# Patient Record
Sex: MALE | Race: WHITE | Employment: OTHER | URBAN - METROPOLITAN AREA
[De-identification: names, ages, dates, MRNs, and addresses within clinical notes are randomized per-mention and may not be internally consistent; named-entity substitution may affect disease eponyms.]

---

## 2022-06-16 ENCOUNTER — OFFICE VISIT (OUTPATIENT)
Dept: PODIATRY | Facility: CLINIC | Age: 72
End: 2022-06-16
Payer: COMMERCIAL

## 2022-06-16 VITALS — SYSTOLIC BLOOD PRESSURE: 144 MMHG | WEIGHT: 177 LBS | DIASTOLIC BLOOD PRESSURE: 60 MMHG

## 2022-06-16 DIAGNOSIS — G62.9 PERIPHERAL POLYNEUROPATHY: ICD-10-CM

## 2022-06-16 DIAGNOSIS — M72.2 PLANTAR FASCIITIS: Primary | ICD-10-CM

## 2022-06-16 PROBLEM — U07.1 COVID-19: Status: ACTIVE | Noted: 2022-06-16

## 2022-06-16 PROBLEM — R79.89 ABNORMAL LIVER FUNCTION TESTS: Status: ACTIVE | Noted: 2022-06-16

## 2022-06-16 PROBLEM — Z87.891 PERSONAL HISTORY OF TOBACCO USE, PRESENTING HAZARDS TO HEALTH: Status: ACTIVE | Noted: 2022-06-16

## 2022-06-16 PROBLEM — G47.00 INSOMNIA: Status: ACTIVE | Noted: 2022-06-16

## 2022-06-16 PROBLEM — M51.26 HERNIATION OF LUMBAR INTERVERTEBRAL DISC WITHOUT MYELOPATHY: Status: ACTIVE | Noted: 2022-06-16

## 2022-06-16 PROBLEM — J44.9 CHRONIC OBSTRUCTIVE PULMONARY DISEASE (H): Status: ACTIVE | Noted: 2022-06-16

## 2022-06-16 PROBLEM — F32.A DEPRESSION: Status: ACTIVE | Noted: 2022-06-16

## 2022-06-16 PROBLEM — L72.0 EPIDERMOID CYST OF SKIN: Status: ACTIVE | Noted: 2022-06-16

## 2022-06-16 PROBLEM — R69 OTHER ILL-DEFINED AND UNKNOWN CAUSES OF MORBIDITY AND MORTALITY: Status: ACTIVE | Noted: 2022-06-16

## 2022-06-16 PROBLEM — I25.10 ATHEROSCLEROSIS OF CORONARY ARTERY: Status: ACTIVE | Noted: 2022-06-16

## 2022-06-16 PROBLEM — R73.01 IMPAIRED FASTING GLUCOSE: Status: ACTIVE | Noted: 2022-06-16

## 2022-06-16 PROBLEM — F10.20 ALCOHOL DEPENDENCE, CONTINUOUS (H): Status: ACTIVE | Noted: 2022-06-16

## 2022-06-16 PROBLEM — M54.50 LOW BACK PAIN: Status: ACTIVE | Noted: 2022-06-16

## 2022-06-16 PROBLEM — D17.39 LIPOMA OF OTHER SKIN AND SUBCUTANEOUS TISSUE: Status: ACTIVE | Noted: 2022-06-16

## 2022-06-16 PROBLEM — I10 HYPERTENSION: Status: ACTIVE | Noted: 2022-06-16

## 2022-06-16 PROBLEM — L71.9 ROSACEA: Status: ACTIVE | Noted: 2022-06-16

## 2022-06-16 PROBLEM — G51.0 BELL'S PALSY: Status: ACTIVE | Noted: 2022-06-16

## 2022-06-16 PROBLEM — E78.5 OTHER AND UNSPECIFIED HYPERLIPIDEMIA: Status: ACTIVE | Noted: 2022-06-16

## 2022-06-16 PROBLEM — J30.9 ALLERGIC RHINITIS: Status: ACTIVE | Noted: 2022-06-16

## 2022-06-16 PROBLEM — N52.9 IMPOTENCE OF ORGANIC ORIGIN: Status: ACTIVE | Noted: 2022-06-16

## 2022-06-16 PROCEDURE — 99203 OFFICE O/P NEW LOW 30 MIN: CPT | Performed by: PODIATRIST

## 2022-06-16 RX ORDER — ROSUVASTATIN CALCIUM 40 MG/1
20 TABLET, COATED ORAL
COMMUNITY
Start: 2022-02-21

## 2022-06-16 RX ORDER — IPRATROPIUM BROMIDE 21 UG/1
SPRAY, METERED NASAL
COMMUNITY
Start: 2022-03-16

## 2022-06-16 RX ORDER — PREDNISONE 20 MG/1
40 TABLET ORAL
COMMUNITY
Start: 2021-08-31

## 2022-06-16 RX ORDER — DOXYCYCLINE HYCLATE 100 MG
100 TABLET ORAL
COMMUNITY
Start: 2021-12-31

## 2022-06-16 RX ORDER — METOPROLOL TARTRATE 50 MG
25 TABLET ORAL 2 TIMES DAILY
COMMUNITY
Start: 2022-04-13

## 2022-06-16 NOTE — PROGRESS NOTES
Subjective:    Patient is seen today as a new pt self referral with a 50 year hx of bilateral heel pain.  Points to the plantarmedial calcaneal tubercle.  Most painful upon rising in a.m. or after prolonged sitting.  Aggravated by activity and relieved by rest.    Denies erythema, edema, ecchymosis,  loss of strength.  Patient is retired.  He is wearing slippers around the house.  Also complains of numbness and tingling in all 10 toes.  This is constant and always there.  Has had this for several years.  States it does not keep him from doing anything.  Denies weakness.  [facility]  History of alcohol abuse.    ROS:  See above       Allergies   Allergen Reactions     Atorvastatin Muscle Pain (Myalgia)       Current Outpatient Medications   Medication Sig Dispense Refill     doxycycline hyclate (VIBRA-TABS) 100 MG tablet Take 100 mg by mouth       ipratropium (ATROVENT) 0.03 % nasal spray        metoprolol tartrate (LOPRESSOR) 50 MG tablet Take 25 mg by mouth       mometasone furoate (ASMANEX HFA) 100 MCG/ACT inhaler        predniSONE (DELTASONE) 20 MG tablet Take 40 mg by mouth       rosuvastatin (CRESTOR) 40 MG tablet Take 20 mg by mouth       tiotropium-olodaterol 2.5-2.5 MCG/ACT AERS          Patient Active Problem List   Diagnosis     Rosacea     Hypertension     Personal history of tobacco use, presenting hazards to health     Other ill-defined and unknown causes of morbidity and mortality     Low back pain     Lipoma of other skin and subcutaneous tissue     Insomnia     Impotence of organic origin     Impaired fasting glucose     Other and unspecified hyperlipidemia     Herpes zoster     Herniation of lumbar intervertebral disc without myelopathy     Epidermoid cyst of skin     Depression     COVID-19     Chronic obstructive pulmonary disease (H)     Bell's palsy     Atherosclerosis of coronary artery     Allergic rhinitis     Alcohol dependence, continuous (H)     Abnormal liver  function tests       History reviewed. No pertinent past medical history.    No past surgical history on file.    No family history on file.    Social History     Tobacco Use     Smoking status: Smoker, Current Status Unknown     Smokeless tobacco: Never Used   Substance Use Topics     Alcohol use: Not on file         Objective:    Vitals: BP (!) 144/60   Wt 80.3 kg (177 lb)   BMI: There is no height or weight on file to calculate BMI.  Height: Data Unavailable    Constitutional/ general:  Pt is in no apparent distress, appears well-nourished.  Cooperative with history and physical exam.     Psych:  The patient answered questions appropriately.  Normal affect.  Seems to have reasonable expectations, in terms of treatment.     Lungs:  Non labored breathing, non labored speech. No cough.  No audible wheezing. Even, quiet breathing.       Vascular:  Pedal pulses are palpable bilaterally for both the DP and PT arteries.  CFT < 3 sec. slight ankle edema pedal hair growth noted.     Neuro:  Alert and oriented x 3. Coordinated gait.  Light touch sensation is diminished on the toes.  Muscle strength 4/5 in all compartments.  Negative Tinel's sign.  Monofilament intact in all digits.    Derm: Normal texture and turgor.  No erythema, ecchymosis, or cyanosis.  No open lesions.     Musculoskeletal:    Lower extremity muscle strength is normal.  Patient is ambulatory without an assistive device or brace.  No gross deformities.      Normal arch with weightbearing.   ROM is within normal limits.   No side to side compression pain of the calcaneus.  Pain upon palpation to the bilateral plantarmedial  of the calcaneus.  No erythema, edema, ecchymosis, or subcutaneous masses noted.  No pain on palpation or stressing any tendons.  Negative Tinel's sign        Assessment:  Plantar Fasciitis right and left foot                          Peripheral neuropathy    Plan:   Discussed etiology and treatment options with the patient.  The  potential causes and nature of plantar fasciitis were discussed with the patient.  We reviewed the natural history/prognosis of the condition and risks if left untreated.  These include chronic pain, other sites of pain due to gait changes, and potential plantar fascial rupture.      We discussed possible causes of the condition as it relates to the patients specific situation.      Conservative treatment options were reviewed:  appropriate shoes, avoidance of barefoot walking, inserts/orthoses, stretching, ice, massage, immobilization and NSAIDs.     We also reviewed the options of injection therapy and surgery.  However, it was made clear that surgery is only considered when conservative therapy fails.  The risks and benefits of injection therapy, and surgery were discussed.  Dispensed handout.       After thorough discussion and answering all questions, the patient elected to modifying activities, supportive shoes, ice, stretching, and not going barefoot.  Good house shoes at all times and I made recommendations.   Dispensed heel cups.    Discussed numbness in toes is from peripheral neuropathy.  Discussed more common causes.  Discussed this could be idiopathic.  Encouraged decrease OH use.  [facility].  RTC as needed.      Roque Andino, NOREEN, FACFAS

## 2022-06-16 NOTE — LETTER
6/16/2022         RE: Saúl Portillo  [address]  [place] [address]        Dear Colleague,    Thank you for referring your patient, Saúl Portillo, to the [facility]. Please see a copy of my visit note below.    Subjective:    Patient is seen today as a new pt self referral with a 50 year hx of bilateral heel pain.  Points to the plantarmedial calcaneal tubercle.  Most painful upon rising in a.m. or after prolonged sitting.  Aggravated by activity and relieved by rest.    Denies erythema, edema, ecchymosis,  loss of strength.  Patient is retired.  He is wearing slippers around the house.  Also complains of numbness and tingling in all 10 toes.  This is constant and always there.  Has had this for several years.  States it does not keep him from doing anything.  Denies weakness.  [facility]  History of alcohol abuse.    ROS:  See above       Allergies   Allergen Reactions     Atorvastatin Muscle Pain (Myalgia)       Current Outpatient Medications   Medication Sig Dispense Refill     doxycycline hyclate (VIBRA-TABS) 100 MG tablet Take 100 mg by mouth       ipratropium (ATROVENT) 0.03 % nasal spray        metoprolol tartrate (LOPRESSOR) 50 MG tablet Take 25 mg by mouth       mometasone furoate (ASMANEX HFA) 100 MCG/ACT inhaler        predniSONE (DELTASONE) 20 MG tablet Take 40 mg by mouth       rosuvastatin (CRESTOR) 40 MG tablet Take 20 mg by mouth       tiotropium-olodaterol 2.5-2.5 MCG/ACT AERS          Patient Active Problem List   Diagnosis     Rosacea     Hypertension     Personal history of tobacco use, presenting hazards to health     Other ill-defined and unknown causes of morbidity and mortality     Low back pain     Lipoma of other skin and subcutaneous tissue     Insomnia     Impotence of organic origin     Impaired fasting glucose     Other and unspecified hyperlipidemia     Herpes zoster     Herniation of lumbar intervertebral disc without  myelopathy     Epidermoid cyst of skin     Depression     COVID-19     Chronic obstructive pulmonary disease (H)     Bell's palsy     Atherosclerosis of coronary artery     Allergic rhinitis     Alcohol dependence, continuous (H)     Abnormal liver function tests       History reviewed. No pertinent past medical history.    No past surgical history on file.    No family history on file.    Social History     Tobacco Use     Smoking status: Smoker, Current Status Unknown     Smokeless tobacco: Never Used   Substance Use Topics     Alcohol use: Not on file         Objective:    Vitals: BP (!) 144/60   Wt 80.3 kg (177 lb)   BMI: There is no height or weight on file to calculate BMI.  Height: Data Unavailable    Constitutional/ general:  Pt is in no apparent distress, appears well-nourished.  Cooperative with history and physical exam.     Psych:  The patient answered questions appropriately.  Normal affect.  Seems to have reasonable expectations, in terms of treatment.     Lungs:  Non labored breathing, non labored speech. No cough.  No audible wheezing. Even, quiet breathing.       Vascular:  Pedal pulses are palpable bilaterally for both the DP and PT arteries.  CFT < 3 sec. slight ankle edema pedal hair growth noted.     Neuro:  Alert and oriented x 3. Coordinated gait.  Light touch sensation is diminished on the toes.  Muscle strength 4/5 in all compartments.  Negative Tinel's sign.  Monofilament intact in all digits.    Derm: Normal texture and turgor.  No erythema, ecchymosis, or cyanosis.  No open lesions.     Musculoskeletal:    Lower extremity muscle strength is normal.  Patient is ambulatory without an assistive device or brace.  No gross deformities.      Normal arch with weightbearing.   ROM is within normal limits.   No side to side compression pain of the calcaneus.  Pain upon palpation to the bilateral plantarmedial  of the calcaneus.  No erythema, edema, ecchymosis, or subcutaneous masses noted.  No  pain on palpation or stressing any tendons.  Negative Tinel's sign        Assessment:  Plantar Fasciitis right and left foot                          Peripheral neuropathy    Plan:   Discussed etiology and treatment options with the patient.  The potential causes and nature of plantar fasciitis were discussed with the patient.  We reviewed the natural history/prognosis of the condition and risks if left untreated.  These include chronic pain, other sites of pain due to gait changes, and potential plantar fascial rupture.      We discussed possible causes of the condition as it relates to the patients specific situation.      Conservative treatment options were reviewed:  appropriate shoes, avoidance of barefoot walking, inserts/orthoses, stretching, ice, massage, immobilization and NSAIDs.     We also reviewed the options of injection therapy and surgery.  However, it was made clear that surgery is only considered when conservative therapy fails.  The risks and benefits of injection therapy, and surgery were discussed.  Dispensed handout.       After thorough discussion and answering all questions, the patient elected to modifying activities, supportive shoes, ice, stretching, and not going barefoot.  Good house shoes at all times and I made recommendations.   Dispensed heel cups.    Discussed numbness in toes is from peripheral neuropathy.  Discussed more common causes.  Discussed this could be idiopathic.  Encouraged decrease OH use.  [facility].  RTC as needed.      Roque Andino DPM, FACFAS          Again, thank you for allowing me to participate in the care of your patient.        Sincerely,        Roque Andino DPM

## 2022-06-16 NOTE — PATIENT INSTRUCTIONS
We wish you continued good healing. If you have any questions or concerns, please do not hesitate to contact us at  [phone]    [employer] (secure e-mail communication and access to your chart) to send a message or to make an appointment.    Please remember to call and schedule a follow up appointment if one was recommended at your earliest convenience.     PODIATRY CLINIC HOURS  TELEPHONE NUMBER    Dr. Roque JERRYPRALEIGH [facility]        Clinics:  [place] Vargas CMA   Tuesday 1PM-6PM  [place]  Wednesday 745AM-330PM  [place]  Thursday/Friday 745AM-230PM  [facility] CRENSHAW/[facility] APPOINTMENTS  [phone]    [place] APPOINTMENTS  [phone]        If you need a medication refill, please contact us you may need lab work and/or a follow up visit prior to your refill (i.e. Antifungal medications).  If MRI needed please call Imaging at [phone] or [phone]  HOW DO I GET MY KNEE SCOOTER? Knee scooters can be picked up at ANY Medical Supply stores with your knee scooter Prescription.  OR  Bring your signed prescription to an [facility] Medical Equipment showroom.

## 2022-07-07 NOTE — PROGRESS NOTES
[facility]      Referring provider: Self-referred    HPI: Mr. Saúl Portillo is a 71 year old  male with PMH significant for    -Hypertension  -Former smoker (patient quit 25 years ago, 25 pack years)  -Impaired fasting glucose  -COPD  -Alcohol dependence  -COVID-19  -Hyperlipidemia.     Patient is a  and [facility]  Today he is telling me that he wants to undergo an angiogram because angiogram 15 years ago showed a collapsed artery.  Few years ago per patient report [facility] and he was told he does not need an angiogram.  He tells me that he is very anxious about his coronary artery disease and he wants to know if he has further worsening of coronary artery disease by coronary angiogram.    He tells me that he is not physically very active.  Feels short of breath with activity which has gotten worse over the last 2 years.  Cannot go up and down the stores and struggles with breathing.  Denies chest pain, dizziness, syncope or lower extremity edema.    Reports normal blood pressure at home.  He tells me his blood pressure is usually high when he comes to clinic.  He quit smoking 25 years ago.  He drinks 2 or 3 alcoholic beverages 4-5 times a week.  He was on baby aspirin until few years ago but he quit because of multiple bruises.  He is currently on rosuvastatin 40 mg, metoprolol 25 mg twice daily and inhalers.  Prior cardiac tests are not available for me to see today.  Patient tells me that he is [facility]    Medications, personal, family, and social history reviewed with patient and revised.    PAST MEDICAL HISTORY:  No past medical history on file.    CURRENT MEDICATIONS:  Current Outpatient Medications   Medication Sig Dispense Refill     doxycycline hyclate (VIBRA-TABS) 100 MG  "tablet Take 100 mg by mouth       ipratropium (ATROVENT) 0.03 % nasal spray        metoprolol tartrate (LOPRESSOR) 50 MG tablet Take 25 mg by mouth       mometasone furoate (ASMANEX HFA) 100 MCG/ACT inhaler        predniSONE (DELTASONE) 20 MG tablet Take 40 mg by mouth       rosuvastatin (CRESTOR) 40 MG tablet Take 20 mg by mouth       tiotropium-olodaterol 2.5-2.5 MCG/ACT AERS          PAST SURGICAL HISTORY:  No past surgical history on file.    ALLERGIES:     Allergies   Allergen Reactions     Atorvastatin Muscle Pain (Myalgia)       FAMILY HISTORY:  No family history on file.      SOCIAL HISTORY:  Social History     Tobacco Use     Smoking status: Smoker, Current Status Unknown     Smokeless tobacco: Never Used       ROS:   Constitutional: No fever, chills, or sweats. Weight stable.   Cardiovascular: As per HPI.       Exam:  /77 (BP Location: Left arm, Cuff Size: Adult Regular)   Pulse 51   Ht 1.654 m (5' 5.1\")   Wt 80.5 kg (177 lb 6.4 oz)   SpO2 96%   BMI 29.43 kg/m    GENERAL APPEARANCE: alert and no distress  HEENT: no icterus, no central cyanosis  LYMPH/NECK: no adenopathy, no asymmetry, JVP not elevated, no carotid bruits.  RESPIRATORY: lungs clear to auscultation - no rales, rhonchi or wheezes, no use of accessory muscles, no retractions, respirations are unlabored, normal respiratory rate  CARDIOVASCULAR: regular rhythm, normal S1, S2, no S3 or S4 and no murmur, click or rub, precordium quiet with normal PMI.  GI: soft, non tender  EXTREMITIES: no edema  NEURO: alert, normal speech,and affect  SKIN: no ecchymoses, no rashes     I have reviewed the labs and personally reviewed the imaging below and made my comment in the assessment and plan.    Labs:  CBC RESULTS:   No results found for: WBC, RBC, HGB, HCT, MCV, MCH, MCHC, RDW, PLT    BMP RESULTS:  No results found for: NA, POTASSIUM, CHLORIDE, CO2, ANIONGAP, GLC, BUN, CR, GFRESTIMATED, GFRESTBLACK, VIPUL     Echocardiogram  No prior    EKG " personally reviewed in clinic today which shows sinus bradycardia and old inferior MI with pathologic Q waves in lead III and aVF.      Assessment and Plan:     #History of coronary artery disease per patient report  #Worsening dyspnea on exertion over the last few years  #Former smoker  #Hypertension  #EKG in clinic today shows old inferior MI  -Patient reports worsening dyspnea on exertion over the last 2 years.  Denies angina.  He tells me that he has history of MI and [facility]  Again per patient report stress test few years ago was normal and he was told he does not need angiogram.  He is reporting extreme anxiety about the status of his coronary arteries.  -Due to worsening dyspnea on exertion I recommended CT coronary angiogram  -No labs available in our system.  We will run some blood tests including CBC, CMP, lipid profile  -Transthoracic echocardiogram    #Hypertension  -Normal blood pressure in clinic today    #Hyperlipidemia  -No recent lipid status in the chart.  We will run lipid test today.    Return to clinic after CTA and echocardiogram.    No medication change today (he stopped taking aspirin 2 years ago.  If CTA shows coronary artery disease I will discuss restarting aspirin)    Total time spent today for this visit is 45 minutes including precharting, face-to-face clinic visit, review of labs/imaging and medical documentation.    Please donot hesitate to contact me if you have any questions or concerns. Again, thank you for allowing me to participate in the care of your patient.    Hina CHIN MD  [facility] Division of Cardiology  Pager [phone]

## 2022-07-08 ENCOUNTER — OFFICE VISIT (OUTPATIENT)
Dept: CARDIOLOGY | Facility: CLINIC | Age: 72
End: 2022-07-08
Payer: COMMERCIAL

## 2022-07-08 VITALS
SYSTOLIC BLOOD PRESSURE: 127 MMHG | BODY MASS INDEX: 29.56 KG/M2 | HEART RATE: 51 BPM | WEIGHT: 177.4 LBS | DIASTOLIC BLOOD PRESSURE: 77 MMHG | OXYGEN SATURATION: 96 % | HEIGHT: 65 IN

## 2022-07-08 DIAGNOSIS — R06.09 DOE (DYSPNEA ON EXERTION): ICD-10-CM

## 2022-07-08 DIAGNOSIS — I25.10 CORONARY ARTERY DISEASE INVOLVING NATIVE CORONARY ARTERY OF NATIVE HEART, UNSPECIFIED WHETHER ANGINA PRESENT: Primary | ICD-10-CM

## 2022-07-08 LAB
ALBUMIN SERPL-MCNC: 3.6 G/DL (ref 3.4–5)
ALP SERPL-CCNC: 76 U/L (ref 40–150)
ALT SERPL W P-5'-P-CCNC: 25 U/L (ref 0–70)
ANION GAP SERPL CALCULATED.3IONS-SCNC: 5 MMOL/L (ref 3–14)
AST SERPL W P-5'-P-CCNC: 15 U/L (ref 0–45)
BILIRUB SERPL-MCNC: 0.6 MG/DL (ref 0.2–1.3)
BUN SERPL-MCNC: 21 MG/DL (ref 7–30)
CALCIUM SERPL-MCNC: 9.4 MG/DL (ref 8.5–10.1)
CHLORIDE BLD-SCNC: 106 MMOL/L (ref 94–109)
CHOLEST SERPL-MCNC: 150 MG/DL
CO2 SERPL-SCNC: 28 MMOL/L (ref 20–32)
CREAT SERPL-MCNC: 1.15 MG/DL (ref 0.66–1.25)
ERYTHROCYTE [DISTWIDTH] IN BLOOD BY AUTOMATED COUNT: 14.7 % (ref 10–15)
FASTING STATUS PATIENT QL REPORTED: YES
GFR SERPL CREATININE-BSD FRML MDRD: 68 ML/MIN/1.73M2
GLUCOSE BLD-MCNC: 107 MG/DL (ref 70–99)
HCT VFR BLD AUTO: 48.3 % (ref 40–53)
HDLC SERPL-MCNC: 58 MG/DL
HGB BLD-MCNC: 15.7 G/DL (ref 13.3–17.7)
LDLC SERPL CALC-MCNC: 69 MG/DL
MCH RBC QN AUTO: 28.8 PG (ref 26.5–33)
MCHC RBC AUTO-ENTMCNC: 32.5 G/DL (ref 31.5–36.5)
MCV RBC AUTO: 89 FL (ref 78–100)
NONHDLC SERPL-MCNC: 92 MG/DL
PLATELET # BLD AUTO: 247 10E3/UL (ref 150–450)
POTASSIUM BLD-SCNC: 4.5 MMOL/L (ref 3.4–5.3)
PROT SERPL-MCNC: 7.5 G/DL (ref 6.8–8.8)
RBC # BLD AUTO: 5.46 10E6/UL (ref 4.4–5.9)
SODIUM SERPL-SCNC: 139 MMOL/L (ref 133–144)
TRIGL SERPL-MCNC: 115 MG/DL
WBC # BLD AUTO: 6.6 10E3/UL (ref 4–11)

## 2022-07-08 PROCEDURE — 36415 COLL VENOUS BLD VENIPUNCTURE: CPT | Performed by: INTERNAL MEDICINE

## 2022-07-08 PROCEDURE — 80053 COMPREHEN METABOLIC PANEL: CPT | Performed by: INTERNAL MEDICINE

## 2022-07-08 PROCEDURE — 85027 COMPLETE CBC AUTOMATED: CPT | Performed by: INTERNAL MEDICINE

## 2022-07-08 PROCEDURE — 99204 OFFICE O/P NEW MOD 45 MIN: CPT | Performed by: INTERNAL MEDICINE

## 2022-07-08 PROCEDURE — 93000 ELECTROCARDIOGRAM COMPLETE: CPT | Performed by: INTERNAL MEDICINE

## 2022-07-08 PROCEDURE — 80061 LIPID PANEL: CPT | Performed by: INTERNAL MEDICINE

## 2022-07-08 ASSESSMENT — PAIN SCALES - GENERAL: PAINLEVEL: NO PAIN (0)

## 2022-07-08 NOTE — LETTER
7/8/2022      RE: Saúl Portillo  [address]  [place] [address]       Dear Colleague,    Thank you for the opportunity to participate in the care of your patient, Saúl Portillo, at the [facility]. Please see a copy of my visit note below.                                                                                                General Cardiology Clinic-[place]      Referring provider: Self-referred    HPI: Mr. Saúl Portillo is a 71 year old  male with PMH significant for    -Hypertension  -Former smoker (patient quit 25 years ago, 25 pack years)  -Impaired fasting glucose  -COPD  -Alcohol dependence  -COVID-19  -Hyperlipidemia.     Patient is a  and all his prior cardiac care has been at [facility].  Today he is telling me that he wants to undergo an angiogram because angiogram 15 years ago showed a collapsed artery.  Few years ago per patient report [facility] and he was told he does not need an angiogram.  He tells me that he is very anxious about his coronary artery disease and he wants to know if he has further worsening of coronary artery disease by coronary angiogram.    He tells me that he is not physically very active.  Feels short of breath with activity which has gotten worse over the last 2 years.  Cannot go up and down the stores and struggles with breathing.  Denies chest pain, dizziness, syncope or lower extremity edema.    Reports normal blood pressure at home.  He tells me his blood pressure is usually high when he comes to clinic.  He quit smoking 25 years ago.  He drinks 2 or 3 alcoholic beverages 4-5 times a week.  He was on baby aspirin until few years ago but he quit because of multiple bruises.  He is currently on rosuvastatin 40 mg, metoprolol 25 mg twice daily and inhalers.  Prior cardiac tests are not available for me to see today.  Patient tells me  "that he is [facility]    Medications, personal, family, and social history reviewed with patient and revised.    PAST MEDICAL HISTORY:  No past medical history on file.    CURRENT MEDICATIONS:  Current Outpatient Medications   Medication Sig Dispense Refill     doxycycline hyclate (VIBRA-TABS) 100 MG tablet Take 100 mg by mouth       ipratropium (ATROVENT) 0.03 % nasal spray        metoprolol tartrate (LOPRESSOR) 50 MG tablet Take 25 mg by mouth       mometasone furoate (ASMANEX HFA) 100 MCG/ACT inhaler        predniSONE (DELTASONE) 20 MG tablet Take 40 mg by mouth       rosuvastatin (CRESTOR) 40 MG tablet Take 20 mg by mouth       tiotropium-olodaterol 2.5-2.5 MCG/ACT AERS          PAST SURGICAL HISTORY:  No past surgical history on file.    ALLERGIES:     Allergies   Allergen Reactions     Atorvastatin Muscle Pain (Myalgia)       FAMILY HISTORY:  No family history on file.      SOCIAL HISTORY:  Social History     Tobacco Use     Smoking status: Smoker, Current Status Unknown     Smokeless tobacco: Never Used       ROS:   Constitutional: No fever, chills, or sweats. Weight stable.   Cardiovascular: As per HPI.       Exam:  /77 (BP Location: Left arm, Cuff Size: Adult Regular)   Pulse 51   Ht 1.654 m (5' 5.1\")   Wt 80.5 kg (177 lb 6.4 oz)   SpO2 96%   BMI 29.43 kg/m    GENERAL APPEARANCE: alert and no distress  HEENT: no icterus, no central cyanosis  LYMPH/NECK: no adenopathy, no asymmetry, JVP not elevated, no carotid bruits.  RESPIRATORY: lungs clear to auscultation - no rales, rhonchi or wheezes, no use of accessory muscles, no retractions, respirations are unlabored, normal respiratory rate  CARDIOVASCULAR: regular rhythm, normal S1, S2, no S3 or S4 and no murmur, click or rub, precordium quiet with normal PMI.  GI: soft, non tender  EXTREMITIES: no edema  NEURO: alert, normal speech,and affect  SKIN: no ecchymoses, no rashes     I have reviewed the labs and " personally reviewed the imaging below and made my comment in the assessment and plan.    Labs:  CBC RESULTS:   No results found for: WBC, RBC, HGB, HCT, MCV, MCH, MCHC, RDW, PLT    BMP RESULTS:  No results found for: NA, POTASSIUM, CHLORIDE, CO2, ANIONGAP, GLC, BUN, CR, GFRESTIMATED, GFRESTBLACK, VIPUL     Echocardiogram  No prior    EKG personally reviewed in clinic today which shows sinus bradycardia and old inferior MI with pathologic Q waves in lead III and aVF.      Assessment and Plan:     #History of coronary artery disease per patient report  #Worsening dyspnea on exertion over the last few years  #Former smoker  #Hypertension  #EKG in clinic today shows old inferior MI  -Patient reports worsening dyspnea on exertion over the last 2 years.  Denies angina.  He tells me that he has history of MI and [facility]  Again per patient report stress test few years ago was normal and he was told he does not need angiogram.  He is reporting extreme anxiety about the status of his coronary arteries.  -Due to worsening dyspnea on exertion I recommended CT coronary angiogram  -No labs available in our system.  We will run some blood tests including CBC, CMP, lipid profile  -Transthoracic echocardiogram    #Hypertension  -Normal blood pressure in clinic today    #Hyperlipidemia  -No recent lipid status in the chart.  We will run lipid test today.    Return to clinic after CTA and echocardiogram.    No medication change today (he stopped taking aspirin 2 years ago.  If CTA shows coronary artery disease I will discuss restarting aspirin)    Total time spent today for this visit is 45 minutes including precharting, face-to-face clinic visit, review of labs/imaging and medical documentation.    Please donot hesitate to contact me if you have any questions or concerns. Again, thank you for allowing me to participate in the care of your patient.    Hina CHIN MD  [facility]  Division of Cardiology  Pager [phone]      Please do not hesitate to contact me if you have any questions/concerns.     Sincerely,     Hina Vera MD

## 2022-07-08 NOTE — LETTER
Date:July 8, 2022      Patient was self referred, no letter generated. Do not send.        [facility] Information

## 2022-07-08 NOTE — NURSING NOTE
Chief Complaint   Patient presents with     New Patient     pt states he has blockage; [facility] -[facility]     Vitals were taken and medications were reconciled.   Asif Angelo, EMT  7:52 AM

## 2022-07-08 NOTE — PATIENT INSTRUCTIONS
Thank you for coming to the [facility] at [place]; please note the following instructions:    1. Labs today     2. CTA    3. Echo    4. Follow up with Dr. Vera after testing    5. We have scheduled your follow-up appointments for you - if these appointments don't work, please call [phone] to reschedule.    6. EKG today        If you have any questions regarding your visit, please contact your care team:     CARDIOLOGY  TELEPHONE NUMBER   Tamela GARLANDMichael, Registered Nurse  Lizette PARKER, Registered Nurse  Alia BERNAL, Registered Medical Assistant  Natalya ISABEL, Visit Facilitator [phone] (select option 1)    *After hours: [phone]   For Scheduling Appts:     [phone] (select option 1)    *After hours: [phone]   For the Device Clinic (Pacemakers and ICD's)  Chhaya HUIZAR, Registered Nurse   During business hours: [phone]    *After business hours:  [phone] (select option 4)      Normal test result notifications will be released via Yowza or mailed within 7 business days.  All other test results, will be communicated via telephone once reviewed by your cardiologist.    If you need a medication refill, please contact your pharmacy.  Please allow 3 business days for your refill to be completed.    As always, thank you for trusting us with your health care needs!

## 2022-07-11 ENCOUNTER — TELEPHONE (OUTPATIENT)
Dept: CARDIOLOGY | Facility: CLINIC | Age: 72
End: 2022-07-11

## 2022-07-11 NOTE — TELEPHONE ENCOUNTER
Spoke to patient and reviewed lab results.  Patient verbalized understanding.    Patient also reports that [facility] and he was advised to have dr. Vera's team call the medical records department to request them #[phone].  Writer did attempt to contact [facility] but no answer.  vm left to fax records to [phone] [facility] for continued care and to call back with questions.    Tamela Jimenez RN  Cardiology Care Coordinator  [facility]  [phone] option 1

## 2022-07-15 ENCOUNTER — ANCILLARY PROCEDURE (OUTPATIENT)
Dept: CARDIOLOGY | Facility: CLINIC | Age: 72
End: 2022-07-15
Attending: INTERNAL MEDICINE
Payer: COMMERCIAL

## 2022-07-15 DIAGNOSIS — I25.10 CORONARY ARTERY DISEASE INVOLVING NATIVE CORONARY ARTERY OF NATIVE HEART, UNSPECIFIED WHETHER ANGINA PRESENT: ICD-10-CM

## 2022-07-15 LAB — LVEF ECHO: NORMAL

## 2022-07-15 PROCEDURE — 93306 TTE W/DOPPLER COMPLETE: CPT | Performed by: INTERNAL MEDICINE

## 2022-07-15 PROCEDURE — 99207 PR STATISTIC IV PUSH SINGLE INITIAL SUBSTANCE: CPT | Performed by: INTERNAL MEDICINE

## 2022-07-15 RX ADMIN — Medication 3 ML: at 09:28

## 2022-08-11 ENCOUNTER — HOSPITAL ENCOUNTER (OUTPATIENT)
Dept: CT IMAGING | Facility: CLINIC | Age: 72
Discharge: HOME | End: 2022-08-11
Attending: INTERNAL MEDICINE
Payer: COMMERCIAL

## 2022-08-11 ENCOUNTER — TRANSFERRED RECORDS (OUTPATIENT)
Dept: HEALTH INFORMATION MANAGEMENT | Facility: CLINIC | Age: 72
End: 2022-08-11

## 2022-08-11 VITALS — SYSTOLIC BLOOD PRESSURE: 137 MMHG | HEART RATE: 62 BPM | DIASTOLIC BLOOD PRESSURE: 75 MMHG | RESPIRATION RATE: 16 BRPM

## 2022-08-11 DIAGNOSIS — R06.09 DOE (DYSPNEA ON EXERTION): ICD-10-CM

## 2022-08-11 DIAGNOSIS — I25.10 CORONARY ARTERY DISEASE INVOLVING NATIVE CORONARY ARTERY OF NATIVE HEART, UNSPECIFIED WHETHER ANGINA PRESENT: ICD-10-CM

## 2022-08-11 DIAGNOSIS — R93.1 ABNORMAL CORONARY ANGIOGRAM: ICD-10-CM

## 2022-08-11 LAB
CREAT BLD-MCNC: 1 MG/DL (ref 0.7–1.3)
GFR SERPL CREATININE-BSD FRML MDRD: >60 ML/MIN/1.73M2

## 2022-08-11 PROCEDURE — 0504T CT FFR: CPT | Mod: GC | Performed by: STUDENT IN AN ORGANIZED HEALTH CARE EDUCATION/TRAINING PROGRAM

## 2022-08-11 PROCEDURE — 250N000011 HC RX IP 250 OP 636: Performed by: STUDENT IN AN ORGANIZED HEALTH CARE EDUCATION/TRAINING PROGRAM

## 2022-08-11 PROCEDURE — 0503T CT FFR: CPT

## 2022-08-11 PROCEDURE — 75574 CT ANGIO HRT W/3D IMAGE: CPT

## 2022-08-11 PROCEDURE — 82565 ASSAY OF CREATININE: CPT

## 2022-08-11 PROCEDURE — 75574 CT ANGIO HRT W/3D IMAGE: CPT | Mod: 26 | Performed by: STUDENT IN AN ORGANIZED HEALTH CARE EDUCATION/TRAINING PROGRAM

## 2022-08-11 PROCEDURE — 250N000013 HC RX MED GY IP 250 OP 250 PS 637: Performed by: STUDENT IN AN ORGANIZED HEALTH CARE EDUCATION/TRAINING PROGRAM

## 2022-08-11 RX ORDER — DIPHENHYDRAMINE HCL 25 MG
25 CAPSULE ORAL
Status: DISCONTINUED | OUTPATIENT
Start: 2022-08-11 | End: 2022-08-12 | Stop reason: HOSPADM

## 2022-08-11 RX ORDER — METOPROLOL TARTRATE 1 MG/ML
5-15 INJECTION, SOLUTION INTRAVENOUS
Status: DISCONTINUED | OUTPATIENT
Start: 2022-08-11 | End: 2022-08-12 | Stop reason: HOSPADM

## 2022-08-11 RX ORDER — METHYLPREDNISOLONE SODIUM SUCCINATE 125 MG/2ML
125 INJECTION, POWDER, LYOPHILIZED, FOR SOLUTION INTRAMUSCULAR; INTRAVENOUS
Status: DISCONTINUED | OUTPATIENT
Start: 2022-08-11 | End: 2022-08-12 | Stop reason: HOSPADM

## 2022-08-11 RX ORDER — DIPHENHYDRAMINE HYDROCHLORIDE 50 MG/ML
25-50 INJECTION INTRAMUSCULAR; INTRAVENOUS
Status: DISCONTINUED | OUTPATIENT
Start: 2022-08-11 | End: 2022-08-12 | Stop reason: HOSPADM

## 2022-08-11 RX ORDER — ONDANSETRON 2 MG/ML
4 INJECTION INTRAMUSCULAR; INTRAVENOUS
Status: DISCONTINUED | OUTPATIENT
Start: 2022-08-11 | End: 2022-08-12 | Stop reason: HOSPADM

## 2022-08-11 RX ORDER — METOPROLOL TARTRATE 25 MG/1
25-100 TABLET, FILM COATED ORAL
Status: DISCONTINUED | OUTPATIENT
Start: 2022-08-11 | End: 2022-08-12 | Stop reason: HOSPADM

## 2022-08-11 RX ORDER — IVABRADINE 5 MG/1
5-15 TABLET, FILM COATED ORAL
Status: DISCONTINUED | OUTPATIENT
Start: 2022-08-11 | End: 2022-08-12 | Stop reason: HOSPADM

## 2022-08-11 RX ORDER — IOPAMIDOL 755 MG/ML
120 INJECTION, SOLUTION INTRAVASCULAR ONCE
Status: COMPLETED | OUTPATIENT
Start: 2022-08-11 | End: 2022-08-11

## 2022-08-11 RX ORDER — NITROGLYCERIN 0.4 MG/1
.4-.8 TABLET SUBLINGUAL
Status: DISCONTINUED | OUTPATIENT
Start: 2022-08-11 | End: 2022-08-12 | Stop reason: HOSPADM

## 2022-08-11 RX ORDER — ACYCLOVIR 200 MG/1
0-1 CAPSULE ORAL
Status: DISCONTINUED | OUTPATIENT
Start: 2022-08-11 | End: 2022-08-12 | Stop reason: HOSPADM

## 2022-08-11 RX ADMIN — IOPAMIDOL 110 ML: 755 INJECTION, SOLUTION INTRAVENOUS at 08:36

## 2022-08-11 RX ADMIN — NITROGLYCERIN 0.8 MG: 0.4 TABLET SUBLINGUAL at 08:04

## 2022-08-11 NOTE — PROGRESS NOTES
Pt arrived for Coronary CT angiogram. Test, meds and side effects reviewed with pt.  Resting HR  58 bpm. Administered 0.8 mg SL nitro on CTA table per order. CTA completed.  Patient tolerated procedure well and denies symptoms of allergic reaction.  Post monitoring completed and VSS.  D/C instructions reviewed with pt whom verbalized understanding of need to increase PO fluids today. D/C to gold waiting room accompanied by staff.

## 2022-08-12 ENCOUNTER — TELEPHONE (OUTPATIENT)
Dept: CARDIOLOGY | Facility: CLINIC | Age: 72
End: 2022-08-12

## 2022-08-12 DIAGNOSIS — I25.10 CORONARY ARTERY DISEASE INVOLVING NATIVE CORONARY ARTERY OF NATIVE HEART, UNSPECIFIED WHETHER ANGINA PRESENT: ICD-10-CM

## 2022-08-12 DIAGNOSIS — R93.1 ELEVATED CORONARY ARTERY CALCIUM SCORE: ICD-10-CM

## 2022-08-12 DIAGNOSIS — I25.10 CORONARY ARTERY DISEASE INVOLVING NATIVE CORONARY ARTERY OF NATIVE HEART, UNSPECIFIED WHETHER ANGINA PRESENT: Primary | ICD-10-CM

## 2022-08-12 DIAGNOSIS — R06.09 DOE (DYSPNEA ON EXERTION): Primary | ICD-10-CM

## 2022-08-12 RX ORDER — ASPIRIN 81 MG/1
243 TABLET, CHEWABLE ORAL ONCE
Status: CANCELLED | OUTPATIENT
Start: 2022-08-12

## 2022-08-12 RX ORDER — SODIUM CHLORIDE 9 MG/ML
INJECTION, SOLUTION INTRAVENOUS CONTINUOUS
Status: CANCELLED | OUTPATIENT
Start: 2022-08-12

## 2022-08-12 RX ORDER — ASPIRIN 325 MG
325 TABLET ORAL ONCE
Status: CANCELLED | OUTPATIENT
Start: 2022-08-12 | End: 2022-08-12

## 2022-08-12 RX ORDER — LIDOCAINE 40 MG/G
CREAM TOPICAL
Status: CANCELLED | OUTPATIENT
Start: 2022-08-12

## 2022-08-12 NOTE — TELEPHONE ENCOUNTER
Call to pt: left msg asking him tcb to discuss results and recommended additional testing. Contact number left.   Will send my chart msg if active

## 2022-08-12 NOTE — TELEPHONE ENCOUNTER
First available coronary angiogram scheduled for 8/29/22- arrive at 12:30 pm.       Instructed pt to start taking aspirin 81 mg daily.         Spoke with pt. Verbalized understanding.   Letter and packet mailed to pt today.

## 2022-08-12 NOTE — TELEPHONE ENCOUNTER
[facility] Call Center    Phone Message    May a detailed message be left on voicemail: yes     Reason for Call: Other: please return call to go over ct scan results      Action Taken: Other: routed to cardiology    Travel Screening: Not Applicable

## 2022-08-12 NOTE — TELEPHONE ENCOUNTER
----- Message from Hina Vera MD sent at 8/11/2022  4:37 PM CDT -----  Saúl,    You have severe blockage in two of your arteries. Right artery one appear the one blocked probably several years ago as you have mentioned during the visit. There is a new one now.  I recommend coronary angiogram.    If you agree, our team can call you and schedule you for this procedure even before I see you.

## 2022-08-12 NOTE — LETTER
MyChart Customer [employer]   [employer] [place]  Phone: [phone]      2022      Saúl VERA Wojciak  [address] [address]        Dear Saúl,    Thank you for your interest in becoming a [employer] user!    Your access code is: [email or web address]  Expires: 8/15/2022  1:15 PM     Please access the [employer]aze website at [email or web address]. Below the ID and password fields, select the  Sign Up Now  as New User. You will be prompted to enter the access code listed above as well as additional personal information. Please follow the directions carefully when creating your username and password.    If you allow your access code to ,or if you have any questions call the Blaze Support line at [phone].    Sincerely,    [employer]   [employer] [place]

## 2022-08-12 NOTE — LETTER
August 12, 2022      TO: Saúl Portillo  [address]  [place] [address]         Dear Saúl,      You are scheduled for a Coronary Angiogram on: 8/29/2022  Place:   [facility],   [address] [place] [address],  [place] [address]. ([phone])    Please arrive and check in at the [facility] Waiting Room at: 12:30 pm.        When you arrive you will be escorted back to the pre procedure area called 2A.     Here they will insert an IV, draw labs, and obtain a short medical history.      A provider will come and talk with you about the procedure and obtain consent. Please ask any questions you may have prior to your procedure.      A nurse from the Cardiac Catheterization Lab will then escort you to the procedure area. You will be receiving sedation during the procedure so you will need someone to drive you to and from the hospital.     After the procedure you will recover for a short period (2 - 6 hours). You will be discharged with instructions for post procedure care. However, depending on what the angiogram shows you may have to have stents placed and this might require an overnight stay.     We ask that you bring a small bag of necessities for your comfort if you would need to stay overnight. DO NOT BRING ANY VALUABLES!             Pre procedure instructions:     COVID TESTING:      You will need to have a covid swab done prior to procedure.          - At-home, rapid antigen test:              - Perform within 1-2 days of procedure              - If negative, take a photo of the result or report the test result verbally on the day of procedure               - If positive, contact Kaylynn Nguyen at [phone]        1. Make arrangements to have a  as you will not be allowed to drive following the procedure. Someone should stay with you the night after your procedure.    2.  Do not eat or drink anything except water for 8 hours prior to arrival.      You may drink water  only until 2 hours prior to arrival.    3. Do not drink alcohol or smoke 24 hours prior to test.    4. YOU DO NOT NEED TO STOP ANY OF YOUR MEDICATIONS EXCEPT:     If you use viagra or cialis- do not use this for 48 hour prior to your procedure.     5. You may take your other morning medications as prescribed with a sip of water.    If you do not take aspirin daily, please take aspirin 325 mg the day prior and the morning of your coronary angiogram. If you take 81 mg daily, continue same dose and take the morning of your procedure.     6. FOLLOW UP APPOINTMENTS:     9/21/22- [place] [facility], Chica JAMISON- follow up from angiogram. This is Dr Vera's PA.   12/27/22: [facility], Dr Vera- 3 month follow up.     If your question concerns the above instructions, contact:    Lizette Vee RN  Cardiology Nurse Coordinator.  [phone],  option 1                    Post Procedure Instructions:  For 24 hours:    Have an adult stay with you for 24 hours.    Relax and take it easy.    Drink plenty of fluids.    You may eat your normal diet, unless your doctor tells you otherwise.    Do NOT make any important or legal decisions.    Do NOT drive or operate machines at home or at work.    Do NOT drink alcohol.    Do NOT smoke.     Medicines:    If you have begun Plavix (clopidogrel), Effient (prasugrel), or Brilinta (ticagrelor), do not stop taking it until you talk to your heart doctor (cardiologist).     If you are on metformin (Glucophage), do not restart it for 72 hours after your procedure or as instructed by your physician.   - If you stopped any other medications, you will be instucted to restart them at the time of discharge. If you have any questions, please call or clarify with the discharging physician/nurse.      Care of wrist or arm site:    It is normal to have soreness at the puncture site and mild tingling in your hand for up to 3 days.    Remove the Band-Aid after 24 hours. If there is minor oozing,  apply another Band-aid and remove it after 12 hours.    Do NOT take a bath, or use a hot tub or pool for the next 48 hours. You may shower.    It is normal to have a small bruise. There should not be a lump at the site.    Do not scrub the site.    Do not use lotion or powder near the puncture site for 3 days.    Care of groin punture site (sometimes used instead of wrist)  - it is normal to have soreness and a small lump or hematoma at the site. If you find the lump gets larger or start to have new back pain, please call.   - Do NOT take a bath or use a hot tub or pool for 72 hours. You may shower.   - Do not scrub the site  - Do not use lotion, powder or ointment near or on the site for 3 days  - bruising at the site is normal.      Activity Restrictions    For 2 days, do not use your hand or arm to support your weight (such as rising from a chair) or bend your wrist  (such as lifting a garage door).    For 2 days, do not lift more than 5 pounds or exercise your arm (tennis, golf or bowling).     If you start bleeding from the site in your arm or groing:  Sit down and press firmly on the site with your fingers for 10 minutes.  Call your doctor as soon as you can.  Call 911 right away if you have bleeding that is heavy or does not stop.  Call your doctor if:    You have a large or growing hard lump around the site.    The site is red, swollen, hot or tender.

## 2022-08-12 NOTE — TELEPHONE ENCOUNTER
Spoke with pt, agrees to proceed with coronary angiogram.     Orders placed. Pt would like to schedule first available.

## 2022-08-18 ENCOUNTER — TELEPHONE (OUTPATIENT)
Dept: CARDIOLOGY | Facility: CLINIC | Age: 72
End: 2022-08-18

## 2022-08-18 NOTE — TELEPHONE ENCOUNTER
Spoke to patient and answered all questions and concerns.  He will perform a home COVID test and will take a picture of it with his phone and bring it to the hospital.    Tamela Jimenez RN  Cardiology Care Coordinator  [facility]  [phone] option 1

## 2022-08-18 NOTE — TELEPHONE ENCOUNTER
[facility] Call Center    Phone Message    May a detailed message be left on voicemail: yes     Reason for Call: Other: please call patient.  Patient has questions regarding upcoming angiogram regarding prep specifically regarding covid protocols       Action Taken: Other: routed to [facility]    Travel Screening: Not Applicable

## 2022-08-23 ENCOUNTER — TELEPHONE (OUTPATIENT)
Dept: CARDIOLOGY | Facility: CLINIC | Age: 72
End: 2022-08-23

## 2022-08-23 NOTE — TELEPHONE ENCOUNTER
[facility] Call Center    Phone Message    May a detailed message be left on voicemail: yes     Reason for Call: Other: Pt Request if stents are used during the Coronary Angiogram pt - wants them from [email or web address] or 2nd choice [employer]     Action Taken: Message routed to:  [facility] cardio    Travel Screening: Not Applicable

## 2022-08-24 NOTE — TELEPHONE ENCOUNTER
Call to pt: informed of information received regarding stents, asked pt tcb if further questions.     Usually use [employer] or [employer]. They will use what ever stent is most appropriate for the lesion type. He can make his case with the interventionalist if he really wants a medtronic stent.

## 2022-08-25 ENCOUNTER — TELEPHONE (OUTPATIENT)
Dept: CARDIOLOGY | Facility: CLINIC | Age: 72
End: 2022-08-25

## 2022-08-25 NOTE — TELEPHONE ENCOUNTER
Spoke to patient and answered all questions and concerns.  Pt is happy.    Tamela Jimenez, JOAQUÍN  Cardiology Care Coordinator  [facility]  [phone] option 1

## 2022-08-25 NOTE — TELEPHONE ENCOUNTER
[facility] Call Center    Phone Message    May a detailed message be left on voicemail: yes     Reason for Call: Other: Pt would like a call back to ask a few questions as he is wondering if he is staying over night and a few othere procedure questions     Action Taken: Message routed to:  [facility] Cardio    Travel Screening: Not Applicable

## 2022-08-26 ENCOUNTER — TELEPHONE (OUTPATIENT)
Dept: CARDIOLOGY | Facility: CLINIC | Age: 72
End: 2022-08-26

## 2022-08-26 NOTE — TELEPHONE ENCOUNTER
Call complete for pre procedure reminder and updated visitor policy.  Aware to complete covid test on Saturday or Sunday and take picture of results

## 2022-08-28 ENCOUNTER — HEALTH MAINTENANCE LETTER (OUTPATIENT)
Age: 72
End: 2022-08-28

## 2022-08-29 ENCOUNTER — APPOINTMENT (OUTPATIENT)
Dept: MEDSURG UNIT | Facility: CLINIC | Age: 72
End: 2022-08-29
Attending: INTERNAL MEDICINE
Payer: COMMERCIAL

## 2022-08-29 ENCOUNTER — APPOINTMENT (OUTPATIENT)
Dept: LAB | Facility: CLINIC | Age: 72
End: 2022-08-29
Attending: INTERNAL MEDICINE
Payer: COMMERCIAL

## 2022-08-29 ENCOUNTER — HOSPITAL ENCOUNTER (OUTPATIENT)
Facility: CLINIC | Age: 72
Discharge: HOME | End: 2022-08-29
Attending: INTERNAL MEDICINE | Admitting: INTERNAL MEDICINE
Payer: COMMERCIAL

## 2022-08-29 VITALS
SYSTOLIC BLOOD PRESSURE: 133 MMHG | BODY MASS INDEX: 29.36 KG/M2 | DIASTOLIC BLOOD PRESSURE: 73 MMHG | HEART RATE: 58 BPM | OXYGEN SATURATION: 96 % | WEIGHT: 177 LBS | RESPIRATION RATE: 16 BRPM | TEMPERATURE: 97.9 F

## 2022-08-29 DIAGNOSIS — R93.1 ELEVATED CORONARY ARTERY CALCIUM SCORE: ICD-10-CM

## 2022-08-29 DIAGNOSIS — R06.09 DOE (DYSPNEA ON EXERTION): ICD-10-CM

## 2022-08-29 DIAGNOSIS — I25.10 CORONARY ARTERY DISEASE INVOLVING NATIVE CORONARY ARTERY OF NATIVE HEART, UNSPECIFIED WHETHER ANGINA PRESENT: ICD-10-CM

## 2022-08-29 LAB
ACT BLD: 292 SECONDS (ref 74–150)
ANION GAP SERPL CALCULATED.3IONS-SCNC: 12 MMOL/L (ref 7–15)
BUN SERPL-MCNC: 19.8 MG/DL (ref 8–23)
CALCIUM SERPL-MCNC: 9.3 MG/DL (ref 8.8–10.2)
CHLORIDE SERPL-SCNC: 103 MMOL/L (ref 98–107)
CREAT SERPL-MCNC: 1.12 MG/DL (ref 0.67–1.17)
DEPRECATED HCO3 PLAS-SCNC: 23 MMOL/L (ref 22–29)
ERYTHROCYTE [DISTWIDTH] IN BLOOD BY AUTOMATED COUNT: 13.5 % (ref 10–15)
GFR SERPL CREATININE-BSD FRML MDRD: 70 ML/MIN/1.73M2
GLUCOSE SERPL-MCNC: 102 MG/DL (ref 70–99)
HCT VFR BLD AUTO: 48.6 % (ref 40–53)
HGB BLD-MCNC: 15.9 G/DL (ref 13.3–17.7)
INR PPP: 0.99 (ref 0.85–1.15)
MCH RBC QN AUTO: 29.2 PG (ref 26.5–33)
MCHC RBC AUTO-ENTMCNC: 32.7 G/DL (ref 31.5–36.5)
MCV RBC AUTO: 89 FL (ref 78–100)
PLATELET # BLD AUTO: 211 10E3/UL (ref 150–450)
POTASSIUM SERPL-SCNC: 4 MMOL/L (ref 3.4–5.3)
RBC # BLD AUTO: 5.44 10E6/UL (ref 4.4–5.9)
SODIUM SERPL-SCNC: 138 MMOL/L (ref 136–145)
WBC # BLD AUTO: 7.4 10E3/UL (ref 4–11)

## 2022-08-29 PROCEDURE — 999N000142 HC STATISTIC PROCEDURE PREP ONLY

## 2022-08-29 PROCEDURE — 250N000009 HC RX 250: Performed by: INTERNAL MEDICINE

## 2022-08-29 PROCEDURE — 999N000134 HC STATISTIC PP CARE STAGE 3

## 2022-08-29 PROCEDURE — 258N000003 HC RX IP 258 OP 636: Performed by: INTERNAL MEDICINE

## 2022-08-29 PROCEDURE — 999N000054 HC STATISTIC EKG NON-CHARGEABLE

## 2022-08-29 PROCEDURE — 93454 CORONARY ARTERY ANGIO S&I: CPT | Mod: 26 | Performed by: INTERNAL MEDICINE

## 2022-08-29 PROCEDURE — 80048 BASIC METABOLIC PNL TOTAL CA: CPT | Performed by: INTERNAL MEDICINE

## 2022-08-29 PROCEDURE — C1887 CATHETER, GUIDING: HCPCS | Performed by: INTERNAL MEDICINE

## 2022-08-29 PROCEDURE — 85347 COAGULATION TIME ACTIVATED: CPT

## 2022-08-29 PROCEDURE — 250N000011 HC RX IP 250 OP 636: Performed by: INTERNAL MEDICINE

## 2022-08-29 PROCEDURE — 99153 MOD SED SAME PHYS/QHP EA: CPT | Performed by: INTERNAL MEDICINE

## 2022-08-29 PROCEDURE — 99152 MOD SED SAME PHYS/QHP 5/>YRS: CPT | Performed by: INTERNAL MEDICINE

## 2022-08-29 PROCEDURE — 99152 MOD SED SAME PHYS/QHP 5/>YRS: CPT | Mod: GC | Performed by: INTERNAL MEDICINE

## 2022-08-29 PROCEDURE — 85027 COMPLETE CBC AUTOMATED: CPT | Performed by: INTERNAL MEDICINE

## 2022-08-29 PROCEDURE — 93572 IV DOP VEL&/PRESS C FLO EA: CPT | Mod: 26 | Performed by: INTERNAL MEDICINE

## 2022-08-29 PROCEDURE — 272N000001 HC OR GENERAL SUPPLY STERILE: Performed by: INTERNAL MEDICINE

## 2022-08-29 PROCEDURE — C1894 INTRO/SHEATH, NON-LASER: HCPCS | Performed by: INTERNAL MEDICINE

## 2022-08-29 PROCEDURE — 250N000013 HC RX MED GY IP 250 OP 250 PS 637: Performed by: INTERNAL MEDICINE

## 2022-08-29 PROCEDURE — 36415 COLL VENOUS BLD VENIPUNCTURE: CPT | Performed by: INTERNAL MEDICINE

## 2022-08-29 PROCEDURE — 93010 ELECTROCARDIOGRAM REPORT: CPT | Performed by: INTERNAL MEDICINE

## 2022-08-29 PROCEDURE — 93571 IV DOP VEL&/PRESS C FLO 1ST: CPT | Performed by: INTERNAL MEDICINE

## 2022-08-29 PROCEDURE — 85610 PROTHROMBIN TIME: CPT | Performed by: INTERNAL MEDICINE

## 2022-08-29 PROCEDURE — 93454 CORONARY ARTERY ANGIO S&I: CPT | Performed by: INTERNAL MEDICINE

## 2022-08-29 PROCEDURE — C1769 GUIDE WIRE: HCPCS | Performed by: INTERNAL MEDICINE

## 2022-08-29 PROCEDURE — 93571 IV DOP VEL&/PRESS C FLO 1ST: CPT | Mod: 26 | Performed by: INTERNAL MEDICINE

## 2022-08-29 RX ORDER — HEPARIN SODIUM 10000 [USP'U]/100ML
100-1000 INJECTION, SOLUTION INTRAVENOUS CONTINUOUS PRN
Status: DISCONTINUED | OUTPATIENT
Start: 2022-08-29 | End: 2022-08-29 | Stop reason: HOSPADM

## 2022-08-29 RX ORDER — ARGATROBAN 1 MG/ML
150 INJECTION, SOLUTION INTRAVENOUS
Status: DISCONTINUED | OUTPATIENT
Start: 2022-08-29 | End: 2022-08-29 | Stop reason: HOSPADM

## 2022-08-29 RX ORDER — FENTANYL CITRATE 50 UG/ML
INJECTION, SOLUTION INTRAMUSCULAR; INTRAVENOUS
Status: DISCONTINUED | OUTPATIENT
Start: 2022-08-29 | End: 2022-08-29 | Stop reason: HOSPADM

## 2022-08-29 RX ORDER — DOPAMINE HYDROCHLORIDE 160 MG/100ML
2-20 INJECTION, SOLUTION INTRAVENOUS CONTINUOUS PRN
Status: DISCONTINUED | OUTPATIENT
Start: 2022-08-29 | End: 2022-08-29 | Stop reason: HOSPADM

## 2022-08-29 RX ORDER — ARGATROBAN 1 MG/ML
350 INJECTION, SOLUTION INTRAVENOUS
Status: DISCONTINUED | OUTPATIENT
Start: 2022-08-29 | End: 2022-08-29 | Stop reason: HOSPADM

## 2022-08-29 RX ORDER — EPTIFIBATIDE 2 MG/ML
180 INJECTION, SOLUTION INTRAVENOUS EVERY 10 MIN PRN
Status: DISCONTINUED | OUTPATIENT
Start: 2022-08-29 | End: 2022-08-29 | Stop reason: HOSPADM

## 2022-08-29 RX ORDER — HEPARIN SODIUM 1000 [USP'U]/ML
INJECTION, SOLUTION INTRAVENOUS; SUBCUTANEOUS
Status: DISCONTINUED | OUTPATIENT
Start: 2022-08-29 | End: 2022-08-29 | Stop reason: HOSPADM

## 2022-08-29 RX ORDER — ASPIRIN 325 MG
325 TABLET ORAL ONCE
Status: COMPLETED | OUTPATIENT
Start: 2022-08-29 | End: 2022-08-29

## 2022-08-29 RX ORDER — EPTIFIBATIDE 2 MG/ML
2 INJECTION, SOLUTION INTRAVENOUS CONTINUOUS PRN
Status: DISCONTINUED | OUTPATIENT
Start: 2022-08-29 | End: 2022-08-29 | Stop reason: HOSPADM

## 2022-08-29 RX ORDER — IOPAMIDOL 755 MG/ML
INJECTION, SOLUTION INTRAVASCULAR
Status: DISCONTINUED | OUTPATIENT
Start: 2022-08-29 | End: 2022-08-29 | Stop reason: HOSPADM

## 2022-08-29 RX ORDER — ASPIRIN 81 MG/1
243 TABLET, CHEWABLE ORAL ONCE
Status: COMPLETED | OUTPATIENT
Start: 2022-08-29 | End: 2022-08-29

## 2022-08-29 RX ORDER — NITROGLYCERIN 20 MG/100ML
10-200 INJECTION INTRAVENOUS CONTINUOUS PRN
Status: DISCONTINUED | OUTPATIENT
Start: 2022-08-29 | End: 2022-08-29 | Stop reason: HOSPADM

## 2022-08-29 RX ORDER — SODIUM CHLORIDE 9 MG/ML
INJECTION, SOLUTION INTRAVENOUS CONTINUOUS
Status: DISCONTINUED | OUTPATIENT
Start: 2022-08-29 | End: 2022-08-29 | Stop reason: HOSPADM

## 2022-08-29 RX ORDER — NICARDIPINE HYDROCHLORIDE 2.5 MG/ML
INJECTION INTRAVENOUS
Status: DISCONTINUED | OUTPATIENT
Start: 2022-08-29 | End: 2022-08-29 | Stop reason: HOSPADM

## 2022-08-29 RX ORDER — NITROGLYCERIN 5 MG/ML
VIAL (ML) INTRAVENOUS
Status: DISCONTINUED | OUTPATIENT
Start: 2022-08-29 | End: 2022-08-29 | Stop reason: HOSPADM

## 2022-08-29 RX ORDER — DOBUTAMINE HYDROCHLORIDE 200 MG/100ML
2-20 INJECTION INTRAVENOUS CONTINUOUS PRN
Status: DISCONTINUED | OUTPATIENT
Start: 2022-08-29 | End: 2022-08-29 | Stop reason: HOSPADM

## 2022-08-29 RX ORDER — LIDOCAINE 40 MG/G
CREAM TOPICAL
Status: DISCONTINUED | OUTPATIENT
Start: 2022-08-29 | End: 2022-08-29 | Stop reason: HOSPADM

## 2022-08-29 RX ORDER — ACETAMINOPHEN 325 MG/1
650 TABLET ORAL EVERY 4 HOURS PRN
Status: CANCELLED | OUTPATIENT
Start: 2022-08-29

## 2022-08-29 RX ADMIN — ASPIRIN 81 MG CHEWABLE TABLET 243 MG: 81 TABLET CHEWABLE at 13:52

## 2022-08-29 RX ADMIN — SODIUM CHLORIDE: 9 INJECTION, SOLUTION INTRAVENOUS at 13:45

## 2022-08-29 ASSESSMENT — ACTIVITIES OF DAILY LIVING (ADL)
ADLS_ACUITY_SCORE: 35

## 2022-08-29 NOTE — PRE-PROCEDURE
GENERAL PRE-PROCEDURE:   Procedure:  Coronary angiogram with possible percutaneous intervention  Date/Time:  8/29/2022 2:10 PM    Written consent obtained?: Yes    Risks and benefits: Risks, benefits and alternatives were discussed    Consent given by:  Patient  Patient states understanding of procedure being performed: Yes    Patient's understanding of procedure matches consent: Yes    Procedure consent matches procedure scheduled: Yes    Expected level of sedation:  Moderate  Appropriately NPO:  Yes  Mallampati  :  Grade 2- soft palate, base of uvula, tonsillar pillars, and portion of posterior pharyngeal wall visible  Lungs:  Lungs clear with good breath sounds bilaterally  Heart:  Normal heart sounds and rate  History & Physical reviewed:  Abbreviated history and physical done prior to moderate sedation  Statement of review:  I have reviewed the lab findings, diagnostic data, medications, and the plan for sedation

## 2022-08-29 NOTE — PROGRESS NOTES
Research Consent Note:     Study Title: IMPact on Revascularization Outcomes of intraVascular ultrasound guided treatment of complex lesions and Economic impact (IMPROVE)    IRB # [id number]    PI pager: Dr. George Winn   [phone]  : Chhaya Cespedes RN, BSN                                  Cell:  [phone]                   Estimated dates of participation: Two years     Consent form was reviewed with the patient.  The purpose, risks, and benefits of study participation were discussed.  The study was reviewed with expected duration of participation, procedures, along with any foreseeable risks or discomforts. It was discussed that study participation is voluntary and that refusal to participate will involve no penalty or decrease benefits to which the subject is otherwise entitled, and the subject may discontinue participation at any time without penalty or loss of benefits. Patient questions were answered. Patient was able to state what study participation involved.  Patient signed the consent/HIPAA form prior to study participation and was given a signed copy.

## 2022-08-29 NOTE — DISCHARGE INSTRUCTIONS
Going Home after Coronary Angiogram     FOR 24 HOURS:        Have an adult stay with you for 24 hours.        Relax and take it easy.        Drink plenty of fluids.        Do NOT make any important or legal decisions.        Do NOT drive or operate machines at home or at work.        Do NOT drink alcohol.     PROCEDURE SITE:  Care of wrist or arm site:        It is normal to have soreness at the puncture site and mild tingling in your hand for up to 3 days.          Remove the Band-Aid after 24 hours. If there is minor oozing, apply another Band-aid and remove it after 12 hours.         Do NOT take a bath, or use a hot tub or pool for the next 48 hours. You may shower.         It is normal to have a small bruise.  There should not be a lump at the site.        Do not scrub the site.        Do not use lotion or powder near the puncture site for 3 days.        For 2 days, do not use your hand or arm to support your weight (such as rising from a chair) or bend your wrist (such as lifting a garage door).        For 2 days, do not lift more than 5 pounds or exercise your arm (tennis, golf or bowling).    If you start bleeding from the site in your arm: Sit down and press firmly on the site with your fingers for 10 minutes. Call your doctor as soon as you can.  Call 911 right away if you have bleeding that is heavy or does not stop.        DIET:  We recommend a diet low in saturated fat, trans fat and cholesterol. In addition it will be helpful to be cautious of sodium intake, sugar and carbohydrates. Try to increase the amount of lean meats you eat like fish and chicken, but avoid frying; and reduce the amount of red meat you eat. Eat more fresh fruits and vegetables and try to avoid canned and processed food. Please reference the handouts you received for more specific information.      CALL YOUR DOCTOR IF:  -You have a large or growing lump/bump around the procedure site  -The site is red, swollen, hot, tender or has  drainage  -You have hives, a rash or unusual itching  -You have increasing or worsening shortness of breath or chest pain    FOLLOW UP:  We prefer you to follow up with your primary care provider within one week. You will be arranged to see the cardiologist (heart doctor) in clinic in a couple of weeks.     Should you need to contact us:  Cardiology clinic for scheduling or triage nurse questions/concerns:  [phone]

## 2022-08-29 NOTE — H&P
[facility]   Interventional Cardiology   History and Physical     Saúl Portillo MRN# [id number]   YOB: 1950 Age: 71 year old     Chief Complaint: chest pain and coronary artery disease    HPI: 70 yo male with PMH of CAD, HTN, forver smoker (25 pack year history), alcohol dependence, HLD, h/o covid-19 infection who is presenting today for coronary angiogram with possible PCI due to CP and history of CAD.     He is feeling in his usual state of health today. He would feel CHANG going up the stairs. No chest pain. No fevers or chills. No abdominal pain, N/V/D. No dysuria. No swelling in his legs. No dysuria. No infectious symptoms. No other changes to his health since he last saw Dr. Vera.    Last ASA was today on 2a.  Not on blood thinners    ROS: The patient is currently denying fever/chills, chest pain, shortness of breath, cough, nausea/vomiting/diarrhea, and swelling in the legs. All other systems were reviewed and were negative.      Consenting/Education for Cardiac Cath Lab Procedure: Coronary Angiogram and Possible Percutaneous Intervention     Patient understands we would like to perform .Coronary Angiogram and Possible Percutaneous Intervention due to CAD and CP. This procedure will be performed by Dr. Paul.    The patient understands the following:     Coronary Angiogram with Possible Percutaneous Intervention: During the portion for the coronary angiogram a fine tube (catheter) is put into the artery in the groin/arm. The tube is carefully passed into each coronary artery. A series of pictures are taken using x-rays and a contrast medium (x-ray dye). The contrast medium is injected to look for any blockages or narrowing in the arteries of the heart. If necessary and indicated, a stent may be deployed during this procedure.  At the end of the procedure the artery may be closed with a special plug, Angio Seal, to stop the bleeding.     Moderate sedation is  required for this procedure, which the patient understands. Patient also understands risks and complications of the procedure which include, but are not limited to bruising/swelling around the incision site, infection, bleeding, allergic reaction to local anesthetic, air embolism, arterial puncture, stroke, heart attack.       Patient verbalized understanding of risks and benefits and has elected to proceed with the procedure or procedures listed above.      No past medical history on file.    No past surgical history on file.    No current facility-administered medications on file prior to encounter.  aspirin (ASA) 81 MG EC tablet, Take 1 tablet (81 mg) by mouth daily  doxycycline hyclate (VIBRA-TABS) 100 MG tablet, Take 100 mg by mouth  ipratropium (ATROVENT) 0.03 % nasal spray,   metoprolol tartrate (LOPRESSOR) 50 MG tablet, Take 25 mg by mouth  mometasone furoate (ASMANEX HFA) 100 MCG/ACT inhaler,   predniSONE (DELTASONE) 20 MG tablet, Take 40 mg by mouth  rosuvastatin (CRESTOR) 40 MG tablet, Take 20 mg by mouth  tiotropium-olodaterol 2.5-2.5 MCG/ACT AERS,         No family history on file.    Social History     Tobacco Use     Smoking status: Smoker, Current Status Unknown     Smokeless tobacco: Never Used   Substance Use Topics     Alcohol use: Not on file       Allergies   Allergen Reactions     Atorvastatin Muscle Pain (Myalgia)         Physical Examination:  Vitals: There were no vitals taken for this visit.  BMI= There is no height or weight on file to calculate BMI.    Constitutional: Alert and awake, well appearing, in no significant pain or respiratory distress  ENT: Mallampati II, he does have some replaced teeth  Skin: No erythema or signs of infection  Cardiac: RRR, no r/m/g  Pulmonary: Normal respiratory effort, good air movement, no adventitious lung sounds, no areas of decreased lung sounds  GI: +BS, NTTP  Extremities: No LE edema, all extremities are warm and well-perfused. 2+ DP  pulses    Laboratory:  CMPNo lab results found in last 7 days.  CBCNo lab results found in last 7 days.    No results found for: TROPI, TROPONIN, TROPR, TROPN      EKG: August 29, 2022 ekg reviewed by me. NSR at a rate of 80. ST depressions in lead V3-V5, similar to EKG from 7/8/22, qrs narrow. qtc 469    Assessment and plan:    72 yo male with PMH of CAD, HTN, forver smoker (25 pack year history), alcohol dependence, HLD, h/o covid-19 infection who is presenting today for coronary angiogram with possible PCI due to CP and history of CAD.     # CAD, CP, Abnormal CT-angiogram  - Proceed with coronary angiogram with possible PCI as planned  - Patient not anticoagulation  - K today of 4.0 acceptable/x mEq K given  - Antiplatlet plan pending procedure  - Received full asa load on 2a    # HTN  - Continue lopressor     # HLD  - Continue Crestor    Florencia Rice PA-C  [facility] Cardiology      Physician Attestation   I have reviewed and discussed with the advanced practice provider their history, physical and plan for Saúl Portillo. I did not participate in a shared visit by interviewing or examining the patient and this should be billed as an advanced practice provider only visit.    George Winn MD

## 2022-08-29 NOTE — Clinical Note
dry, intact, no bleeding and no hematoma. 6fr sheath removed from R rad artery, TR band applied see flowsheet

## 2022-08-29 NOTE — PROGRESS NOTES
2A prep for CORS angiogram PCI. Pt alert and oriented. VSS. Appropriately NPO. Took 81mg ASA gave 243 mg ASA. Left 20G piv infusing. Groin clipped. Pedal pulses marked. Spouse Lauren at bedside.

## 2022-08-29 NOTE — PROGRESS NOTES
D/I/A: Pt roomed on 3C in bay [address].  Arrived via litter and accompanied by CCL RN and Wife Lauren On/Off: Off monitor.  VSSA.  Rhythm upon arrival SR on monitor.  Denies pain or sob.  Reviewed activity restrictions and when to notify RN, ie-changes to breathing or increased chest pressure or chest pain.  CCL access:  Right radial TR band in place-14 ml of air to start deflation at 2000.  P: Continue to monitor status.  Discharge to home once meeting criteria.

## 2022-08-30 ENCOUNTER — TELEPHONE (OUTPATIENT)
Dept: CARDIOLOGY | Facility: CLINIC | Age: 72
End: 2022-08-30

## 2022-08-30 LAB
ATRIAL RATE - MUSE: 82 BPM
DIASTOLIC BLOOD PRESSURE - MUSE: NORMAL MMHG
INTERPRETATION ECG - MUSE: NORMAL
P AXIS - MUSE: 43 DEGREES
PR INTERVAL - MUSE: 162 MS
QRS DURATION - MUSE: 106 MS
QT - MUSE: 402 MS
QTC - MUSE: 469 MS
R AXIS - MUSE: -31 DEGREES
SYSTOLIC BLOOD PRESSURE - MUSE: NORMAL MMHG
T AXIS - MUSE: 115 DEGREES
VENTRICULAR RATE- MUSE: 82 BPM

## 2022-08-30 NOTE — PROGRESS NOTES
/73   Pulse 58   Temp 97.9  F (36.6  C) (Oral)   Resp 16   Wt 80.3 kg (177 lb)   SpO2 96%   BMI 29.36 kg/m    Condition is stable s/p CORS. Vital Signs are stable/WNL. Right radial site clean, dry and intact, cms intact.  Discharge instructions reviewed with patient and questions answered. Patient verbalizes understanding. IV removed. Pain under control. Patient is ambulating and voiding spontaneously. Patient is tolerating regular diet and denies any N/V. Patient to be discharged to home via wife Lauren. Patient has all belongings.

## 2022-08-30 NOTE — TELEPHONE ENCOUNTER
S-(situation): patient had coronary angiogram yesterday.  One vessel obstructive CAD ( of mRCA). Otherwise mild to moderate non obstructive CAD elsewhere (OM stenosis not hemodynamically significant as assessed by dPR of 0.97 and proximal LAD stenosis not hemodynamically significant as assessed by dPR of 0.92).    Right radial artery used for access, site is flat and dry. Reviewed activity restrictions. No new meds at discharge.      B-(background): 72 yo male with PMH of CAD, HTN, former smoker (25 pack year history), alcohol dependence, HLD, h/o covid-19 infection who is presenting today for coronary angiogram with possible PCI due to CP and history of CAD.    A-(assessment):  of RCA    R-(recommendations): follow up with Chica Rodriguez September 21 for assessment of symptoms. Patient states he is short of breath going up stairs. Refer to Dr. Pineda for possible PCI of .

## 2022-09-21 ENCOUNTER — OFFICE VISIT (OUTPATIENT)
Dept: CARDIOLOGY | Facility: CLINIC | Age: 72
End: 2022-09-21
Payer: COMMERCIAL

## 2022-09-21 VITALS
DIASTOLIC BLOOD PRESSURE: 78 MMHG | BODY MASS INDEX: 29.86 KG/M2 | OXYGEN SATURATION: 95 % | SYSTOLIC BLOOD PRESSURE: 131 MMHG | WEIGHT: 180 LBS | HEART RATE: 65 BPM

## 2022-09-21 DIAGNOSIS — I10 PRIMARY HYPERTENSION: ICD-10-CM

## 2022-09-21 DIAGNOSIS — I25.10 CORONARY ARTERY DISEASE INVOLVING NATIVE CORONARY ARTERY OF NATIVE HEART, UNSPECIFIED WHETHER ANGINA PRESENT: ICD-10-CM

## 2022-09-21 DIAGNOSIS — R93.1 ELEVATED CORONARY ARTERY CALCIUM SCORE: ICD-10-CM

## 2022-09-21 DIAGNOSIS — E78.00 PURE HYPERCHOLESTEROLEMIA: Primary | ICD-10-CM

## 2022-09-21 PROCEDURE — 99214 OFFICE O/P EST MOD 30 MIN: CPT | Performed by: NURSE PRACTITIONER

## 2022-09-21 RX ORDER — GABAPENTIN 100 MG/1
100 CAPSULE ORAL
COMMUNITY
Start: 2022-08-16

## 2022-09-21 NOTE — PATIENT INSTRUCTIONS
Thank you for coming to the [facility] [place]; please note the following instructions:    1. Follow up in three months in cardiology.        If you have any questions regarding your visit please contact your care team:     Cardiology  Telephone Number   Tamela BRANNON, RN  Lizette PARKER, RN   Kary MCNEIL, MICHAELLE BERNAL, MICHAELLE ISABEL, Visit Facilitator   [phone] (option 1)   For scheduling appts:     [phone] (select option 1)       For the Device Clinic (Pacemakers and ICD's)  RN's :  Chhaya Dejesus   During business hours: [phone]    *After business hours:  [phone] (select option 4)      Normal test result notifications will be released via [employer] or mailed within 7 business days.  All other test results, will be communicated via telephone once reviewed by your cardiologist.    If you need a medication refill please contact your pharmacy.  Please allow 3 business days for your refill to be completed.    As always, thank you for trusting us with your health care needs!

## 2022-09-21 NOTE — LETTER
9/21/2022      RE: Saúl Portillo  [address] [place]  [place] [address]       Dear Colleague,    Thank you for the opportunity to participate in the care of your patient, Saúl Portillo, at the [facility]. Please see a copy of my visit note below.          Cardiology Clinic Note      HPI: Mr. Saúl Portillo is a 71 year old  male with PMHx significant for HTN, HLD, former tobacco use (patient quit 25 years ago, 25 pack years)  impaired fasting glucose, COPD, ETOH dependence and CAD who presents to cardiology clinic for post angiogram follow up.     In review, patient saw Dr. Vera 7[facility] He reported that he was told he needed an angiogram in the past and was having increased SOB. A coronary CTA with FFR was recommended which revealed severe stenosis of prox-mid and distal RCA as well as LCx stenosis. Patient was referred for coronary angiogram which he underwent on 8/29 which revealed one vessel obstructive CAD with  of mRCA. No interventions were performed. (Lcx occlusion not HD significant) Recent echo 7/2022 reveals preserved EF and no valvular disease. CT also noted extracardiac findings of moderate emphysema.     Since his angiogram, he has been feeling well. Access site healed well. He still has SOB, though he tells me he has had this for 20 years without change. He was exposed to Agent Orange when he served in the . He does use an inhaler and has had [facility] ([facility])  Denies chest pain, dizziness, syncope or lower extremity edema.     Current Medications:  ASA 81 mg daily  Crestor 40 mg daily  Metop Tartrate 25 mg BID  Prednisone    Current Outpatient Medications   Medication Sig Dispense Refill     aspirin (ASA) 81 MG EC tablet Take 1 tablet (81 mg) by mouth daily       doxycycline hyclate (VIBRA-TABS) 100 MG tablet Take 100 mg by mouth       ipratropium  (ATROVENT) 0.03 % nasal spray        metoprolol tartrate (LOPRESSOR) 50 MG tablet Take 25 mg by mouth       mometasone furoate (ASMANEX HFA) 100 MCG/ACT inhaler        predniSONE (DELTASONE) 20 MG tablet Take 40 mg by mouth       rosuvastatin (CRESTOR) 40 MG tablet Take 20 mg by mouth       tiotropium-olodaterol 2.5-2.5 MCG/ACT AERS          No past medical history on file.    Past Surgical History:   Procedure Laterality Date     CARDIAC SURGERY       CV CORONARY ANGIOGRAM N/A 8/29/2022    Procedure: Coronary Angiogram;  Surgeon: Wili Pineda MD;  Location: [facility] CARDIAC CATH LAB     CV INSTANTANEOUS WAVE-FREE RATIO N/A 8/29/2022    Procedure: Instantaneous Wave-Free Ratio;  Surgeon: Wili Pineda MD;  Location: [facility] CARDIAC CATH LAB     HERNIA REPAIR       ORTHOPEDIC SURGERY         No family history on file.    Social History     Tobacco Use     Smoking status: Smoker, Current Status Unknown     Smokeless tobacco: Never Used   Substance Use Topics     Alcohol use: Not Currently       Allergies   Allergen Reactions     Atorvastatin Muscle Pain (Myalgia)         ROS:   Negative besides that noted in HPI    Physical Examination:  Vitals: /78 (BP Location: Left arm, Patient Position: Chair, Cuff Size: Adult Large)   Pulse 65   Wt 81.6 kg (180 lb)   SpO2 95%   BMI 29.86 kg/m    BMI= Body mass index is 29.86 kg/m .    GENERAL APPEARANCE: healthy, alert and no distress  HEENT: no icterus  NECK: JVP is not visible  CHEST: lungs clear to auscultation - no rales, rhonchi or wheezes  CARDIOVASCULAR: regular rhythm, normal S1, S2,   EXTREMITIES: no clubbing, cyanosis or edema  NEURO: alert and oriented to person/place/time, normal speech  VASC: Peripheral pulses are normal in volumes and symmetric bilaterally.   SKIN: no ecchymoses, no rashes    Laboratory/Imaging:  Echo 7/22  Global and regional left ventricular function is normal with an EF of 55-60%.  Global right  ventricular function is normal.  No significant valvular abnormalities present.  Previous study not available for comparison.    Coronary angiogram:  72 yo male with PMH of CAD, HTN, former smoker (25 pack year history), alcohol dependence, HLD, h/o covid-19 infection who is presenting today for coronary angiogram with possible PCI due to CP and history of CAD.       Pre Procedure Diagnosis    stable known CAD      Post Procedure Diagnosis    One vessel obstructive CAD        Conclusion    One vessel obstructive CAD ( of mRCA). Otherwise mild to moderate non obstructive CAD elsewhere (OM stenosis not hemodynamically significant as assessed by dPR of 0.97 and proximal LAD stenosis not hemodynamically significant as assessed by dPR of 0.92).        Plan      Follow bedrest per protocol    Continued medical management and lifestyle modifications for cardiovascular risk factor optimizations.    Follow up visit with Nurse Practitioner in 1-2 weeks.    Follow up with Dr. Dr Pineda.    Arterial sheath removed from radial artery with TR band placement.    Discharge today per protocol       Assessment:  # CAD, obstructive-  Ongoing symptoms of SOB which may be related to lungs. No PCI performed given  of RCA and non hd significant lesion of LCx.   - Continue ASA, statin  - Add anti- anginal in future if ongoing symptoms  - Follow up pulm for moderate emphysema  - Cardiac rehab    # HTN  # HLD  COPD  - moderate emphysema on CT    # Former tobacco use (patient quit 25 years ago, 25 pack years)  # Impaired fasting glucose  # ETOH dependence    Recommendations:  # Reviewed the angiogram together. Discussed anti anginal's and other causes of SOB (such as pulm/COPD) Patient not wanting anti- anginals currently. Discussed potential pulm follow up  # Continue risk factor modification and keep up the good work staying active and taking your aspirin and statin  # Plan to see back in 3-6 months     BELGICA Capellan,  CNP  [facility] Cardiology  [phone]

## 2022-09-21 NOTE — NURSING NOTE
"Chief Complaint   Patient presents with     RECHECK     S/P coronary angiogram.        Initial /78 (BP Location: Left arm, Patient Position: Chair, Cuff Size: Adult Large)   Pulse 65   Wt 81.6 kg (180 lb)   SpO2 95%   BMI 29.86 kg/m   Estimated body mass index is 29.86 kg/m  as calculated from the following:    Height as of 7/8/22: 1.654 m (5' 5.1\").    Weight as of this encounter: 81.6 kg (180 lb)..  BP completed using cuff size: MICHAELLE Guthrie  "

## 2022-09-21 NOTE — PROGRESS NOTES
Cardiology Clinic Note      HPI: Mr. Saúl Portillo is a 71 year old  male with PMHx significant for HTN, HLD, former tobacco use (patient quit 25 years ago, 25 pack years)  impaired fasting glucose, COPD, ETOH dependence and CAD who presents to cardiology clinic for post angiogram follow up.     In review, patient saw Dr. Vera 7/2022 [facility] He reported that he was told he needed an angiogram in the past and was having increased SOB. A coronary CTA with FFR was recommended which revealed severe stenosis of prox-mid and distal RCA as well as LCx stenosis. Patient was referred for coronary angiogram which he underwent on 8/29 which revealed one vessel obstructive CAD with  of mRCA. No interventions were performed. (Lcx occlusion not HD significant) Recent echo 7/2022 reveals preserved EF and no valvular disease. CT also noted extracardiac findings of moderate emphysema.     Since his angiogram, he has been feeling well. Access site healed well. He still has SOB, though he tells me he has had this for 20 years without change. He was exposed to Agent Orange when he served in the . He does use an inhaler and has had [facility])  Denies chest pain, dizziness, syncope or lower extremity edema.     Current Medications:  ASA 81 mg daily  Crestor 40 mg daily  Metop Tartrate 25 mg BID  Prednisone    Current Outpatient Medications   Medication Sig Dispense Refill     aspirin (ASA) 81 MG EC tablet Take 1 tablet (81 mg) by mouth daily       doxycycline hyclate (VIBRA-TABS) 100 MG tablet Take 100 mg by mouth       ipratropium (ATROVENT) 0.03 % nasal spray        metoprolol tartrate (LOPRESSOR) 50 MG tablet Take 25 mg by mouth       mometasone furoate (ASMANEX HFA) 100 MCG/ACT inhaler        predniSONE (DELTASONE) 20 MG tablet Take 40 mg by mouth       rosuvastatin (CRESTOR) 40 MG tablet Take 20 mg by mouth       tiotropium-olodaterol 2.5-2.5 MCG/ACT AERS           No past medical history on file.    Past Surgical History:   Procedure Laterality Date     CARDIAC SURGERY       CV CORONARY ANGIOGRAM N/A 8/29/2022    Procedure: Coronary Angiogram;  Surgeon: Wili Pineda MD;  Location: [facility] CARDIAC CATH LAB     CV INSTANTANEOUS WAVE-FREE RATIO N/A 8/29/2022    Procedure: Instantaneous Wave-Free Ratio;  Surgeon: Wili Pineda MD;  Location: [facility] CARDIAC CATH LAB     HERNIA REPAIR       ORTHOPEDIC SURGERY         No family history on file.    Social History     Tobacco Use     Smoking status: Smoker, Current Status Unknown     Smokeless tobacco: Never Used   Substance Use Topics     Alcohol use: Not Currently       Allergies   Allergen Reactions     Atorvastatin Muscle Pain (Myalgia)         ROS:   Negative besides that noted in HPI    Physical Examination:  Vitals: /78 (BP Location: Left arm, Patient Position: Chair, Cuff Size: Adult Large)   Pulse 65   Wt 81.6 kg (180 lb)   SpO2 95%   BMI 29.86 kg/m    BMI= Body mass index is 29.86 kg/m .    GENERAL APPEARANCE: healthy, alert and no distress  HEENT: no icterus  NECK: JVP is not visible  CHEST: lungs clear to auscultation - no rales, rhonchi or wheezes  CARDIOVASCULAR: regular rhythm, normal S1, S2,   EXTREMITIES: no clubbing, cyanosis or edema  NEURO: alert and oriented to person/place/time, normal speech  VASC: Peripheral pulses are normal in volumes and symmetric bilaterally.   SKIN: no ecchymoses, no rashes    Laboratory/Imaging:  Echo 7/22  Global and regional left ventricular function is normal with an EF of 55-60%.  Global right ventricular function is normal.  No significant valvular abnormalities present.  Previous study not available for comparison.    Coronary angiogram:  72 yo male with PMH of CAD, HTN, former smoker (25 pack year history), alcohol dependence, HLD, h/o covid-19 infection who is presenting today for coronary angiogram with possible PCI due to CP  and history of CAD.       Pre Procedure Diagnosis    stable known CAD      Post Procedure Diagnosis    One vessel obstructive CAD        Conclusion    One vessel obstructive CAD ( of mRCA). Otherwise mild to moderate non obstructive CAD elsewhere (OM stenosis not hemodynamically significant as assessed by dPR of 0.97 and proximal LAD stenosis not hemodynamically significant as assessed by dPR of 0.92).        Plan      Follow bedrest per protocol    Continued medical management and lifestyle modifications for cardiovascular risk factor optimizations.    Follow up visit with Nurse Practitioner in 1-2 weeks.    Follow up with Dr. Dr Pineda.    Arterial sheath removed from radial artery with TR band placement.    Discharge today per protocol       Assessment:  # CAD, obstructive-  Ongoing symptoms of SOB which may be related to lungs. No PCI performed given  of RCA and non hd significant lesion of LCx.   - Continue ASA, statin  - Add anti- anginal in future if ongoing symptoms  - Follow up pulm for moderate emphysema  - Cardiac rehab    # HTN  # HLD  COPD  - moderate emphysema on CT    # Former tobacco use (patient quit 25 years ago, 25 pack years)  # Impaired fasting glucose  # ETOH dependence    Recommendations:  # Reviewed the angiogram together. Discussed anti anginal's and other causes of SOB (such as pulm/COPD) Patient not wanting anti- anginals currently. Discussed potential pulm follow up  # Continue risk factor modification and keep up the good work staying active and taking your aspirin and statin  # Plan to see back in 3-6 months     BELGICA Capellan, CNP  [facility] Cardiology  [phone]

## 2022-12-11 NOTE — PROGRESS NOTES
General Cardiology Clinic-[place]      Referring provider: Self-referred    HPI: Mr. Saúl Portillo is a 71 year old  male with PMH significant for    -Hypertension  -Former smoker (patient quit 25 years ago, 25 pack years)  -Impaired fasting glucose  -COPD  -Alcohol dependence  -COVID-19  -Hyperlipidemia.     Patient is a  and all his prior cardiac care has been at [facility].  Today he is telling me that he wants to undergo an angiogram because angiogram 15 years ago showed a collapsed artery.  Few years ago per patient report [facility] and he was told he does not need an angiogram.  He tells me that he is very anxious about his coronary artery disease and he wants to know if he has further worsening of coronary artery disease by coronary angiogram.    He tells me that he is not physically very active.  Feels short of breath with activity which has gotten worse over the last 2 years.  Cannot go up and down the stores and struggles with breathing.  Denies chest pain, dizziness, syncope or lower extremity edema.    Reports normal blood pressure at home.  He tells me his blood pressure is usually high when he comes to clinic.  He quit smoking 25 years ago.  He drinks 2 or 3 alcoholic beverages 4-5 times a week.  He was on baby aspirin until few years ago but he quit because of multiple bruises.  He is currently on rosuvastatin 40 mg, metoprolol 25 mg twice daily and inhalers.  Prior cardiac tests are not available for me to see today.  Patient tells me that he is [facility]    Medications, personal, family, and social history reviewed with patient and revised.    Interval history 12/14/2022:     A coronary CTA with FFR revealed severe stenosis of prox-mid and distal RCA as well as LCx stenosis. Patient was referred for coronary angiogram which he  underwent on 8/29 which revealed one vessel obstructive CAD with  of mRCA. No interventions were performed.   Since then, he has been feeling well. He denies chest pain/pressure, dizziness, palpitations, orthopnea, weight gain or syncope.  He has exertional SOB after walking less than a block, but this has been stable for 20 years, and has been attributed to his COPD.  Despite his exertional SOB, Mr. Portillo tries to stay active. Just this morning, he tried to shovel snow with no limitation.    PAST MEDICAL HISTORY:  As above    CURRENT MEDICATIONS:  Current Outpatient Medications   Medication Sig Dispense Refill     doxycycline hyclate (VIBRA-TABS) 100 MG tablet Take 100 mg by mouth       gabapentin (NEURONTIN) 100 MG capsule 100 mg       HYDROcodone-acetaminophen (NORCO) 5-325 MG tablet Take 1 tablet by mouth as needed for severe pain (7-10)       ipratropium (ATROVENT) 0.03 % nasal spray        metoprolol tartrate (LOPRESSOR) 50 MG tablet Take 25 mg by mouth 2 times daily       mometasone furoate (ASMANEX HFA) 100 MCG/ACT inhaler        rosuvastatin (CRESTOR) 40 MG tablet Take 20 mg by mouth       tiotropium-olodaterol 2.5-2.5 MCG/ACT AERS        aspirin (ASA) 81 MG EC tablet Take 1 tablet (81 mg) by mouth daily (Patient not taking: Reported on 12/14/2022)       predniSONE (DELTASONE) 20 MG tablet Take 40 mg by mouth (Patient not taking: Reported on 9/21/2022)         PAST SURGICAL HISTORY:  Past Surgical History:   Procedure Laterality Date     CARDIAC SURGERY       CV CORONARY ANGIOGRAM N/A 8/29/2022    Procedure: Coronary Angiogram;  Surgeon: Wili Pineda MD;  Location:  HEART CARDIAC CATH LAB     CV INSTANTANEOUS WAVE-FREE RATIO N/A 8/29/2022    Procedure: Instantaneous Wave-Free Ratio;  Surgeon: Wili Pineda MD;  Location:  HEART CARDIAC CATH LAB     HERNIA REPAIR       ORTHOPEDIC SURGERY         ALLERGIES:     Allergies   Allergen Reactions     Atorvastatin Muscle  "Pain (Myalgia)       FAMILY HISTORY:  History reviewed. No pertinent family history.      SOCIAL HISTORY:  Social History     Tobacco Use     Smoking status: Never     Smokeless tobacco: Never   Substance Use Topics     Alcohol use: Yes     Comment: 2-3 beers a day     Drug use: Never       ROS:   Constitutional: No fever, chills, or sweats. Weight stable.   Cardiovascular: As per HPI.       Exam:  /81   Pulse 66   Ht 1.651 m (5' 5\")   Wt 82.1 kg (181 lb)   SpO2 94%   BMI 30.12 kg/m    GENERAL APPEARANCE: alert and no distress  RESPIRATORY: lungs clear to auscultation - no rales, rhonchi or wheezes, no use of accessory muscles, no retractions, respirations are unlabored, normal respiratory rate  CARDIOVASCULAR: regular rhythm, normal S1, S2, no S3 or S4 and no murmur, click or rub, precordium quiet with normal PMI.  EXTREMITIES: no edema     I have reviewed the labs and personally reviewed the imaging below and made my comment in the assessment and plan.    Labs:  CBC RESULTS:   Lab Results   Component Value Date    WBC 7.4 08/29/2022    RBC 5.44 08/29/2022    HGB 15.9 08/29/2022    HCT 48.6 08/29/2022    MCV 89 08/29/2022    MCH 29.2 08/29/2022    MCHC 32.7 08/29/2022    RDW 13.5 08/29/2022     08/29/2022       BMP RESULTS:  Lab Results   Component Value Date     08/29/2022    POTASSIUM 4.0 08/29/2022    POTASSIUM 4.5 07/08/2022    CHLORIDE 103 08/29/2022    CHLORIDE 106 07/08/2022    CO2 23 08/29/2022    CO2 28 07/08/2022    ANIONGAP 12 08/29/2022    ANIONGAP 5 07/08/2022     (H) 08/29/2022     (H) 07/08/2022    BUN 19.8 08/29/2022    BUN 21 07/08/2022    CR 1.12 08/29/2022    GFRESTIMATED 70 08/29/2022    GFRESTIMATED >60 08/11/2022    VIPUL 9.3 08/29/2022      EKG 8/29/2022      Echocardiogram 7/15/2022     Global and regional left ventricular function is normal with an EF of 55-60%.  Global right ventricular function is normal.  No significant valvular abnormalities " present.  Previous study not available for comparison.      Coronary CT 7/8/2022  IMPRESSION:  1.  Severe stenoses in the proximal-mid and distal segments of the  right coronary artery. Both lesions are likely chronic total  occlusions.  2.  Hemodynamically significant lesion in the mid-left circumflex  artery (CT-FFR 0.71).   3.  Moderate lesion in the proximal LAD that is of borderline  hemodynamic significance (CT-FFR 0.79).  4.  Total Agatston score 1743 placing the patient in the 90th  percentile when compared to an age- and gender-matched control group.  5.  Please review the separate Radiology report for incidental  noncardiac findings.    Coronary angiography 8/29/2022  One vessel obstructive CAD ( of mRCA). Otherwise mild to moderate non obstructive CAD elsewhere (OM stenosis not hemodynamically significant as assessed by dPR of 0.97 and proximal LAD stenosis not hemodynamically significant as assessed by dPR of 0.92).        Assessment and Plan:     # Coronary artery disease ( of RCA in 8/2022, as by patient known  for the last 15 years. Prior angiogram not on record)  #Former smoker    Plan:  - Continue aspirin 81 mg daily, rosuvastatin 20 mg daily (patient was not taking aspirin because of easy bruising. Explained risks and benefits of aspirin in terms of secondary prevention. Patient agreed to restart aspirin)  - No angina, no need for anti anginals.  - Goal LDL<70 (already 69 in 7/2022)  - Regular exercise  - Mediterranean diet    #Hypertension  -Normal blood pressure in clinic today  Continue metoprolol 25 mg twice daily.    #Hyperlipidemia  LDL<70  Continue rosuvastatin 20 mg daily    Return to clinic as needed.    Discussed with Dr. Chuy Gao MD  Cardiovascular disease fellow  [facility]  [phone]  12/14/2022 8:53 AM        Attending note  12/7/2022      I have personally seen and examined the patient in clinic today. I agree with the fellow's  findings and plan of care as documented in the note.       I personally reviewed vital signs, medications, labs, imaging, and ECG.     The history and physical findings are accurate as recorded. My additional findings, if any, have been incorporated into the body of the note. The assessment and plans outlined reflect our joint decision making.     Total time spent 30 minutes including prior charting, face-to-face clinic visit, review of medical records and medical documentation.     Hina CHIN MD  [facility] Division of Cardiology  Pager [phone]

## 2022-12-14 ENCOUNTER — OFFICE VISIT (OUTPATIENT)
Dept: CARDIOLOGY | Facility: CLINIC | Age: 72
End: 2022-12-14
Payer: COMMERCIAL

## 2022-12-14 VITALS
OXYGEN SATURATION: 94 % | HEIGHT: 65 IN | DIASTOLIC BLOOD PRESSURE: 81 MMHG | BODY MASS INDEX: 30.16 KG/M2 | SYSTOLIC BLOOD PRESSURE: 127 MMHG | HEART RATE: 66 BPM | WEIGHT: 181 LBS

## 2022-12-14 DIAGNOSIS — I25.10 CORONARY ARTERY DISEASE INVOLVING NATIVE CORONARY ARTERY OF NATIVE HEART WITHOUT ANGINA PECTORIS: Primary | ICD-10-CM

## 2022-12-14 PROCEDURE — 99214 OFFICE O/P EST MOD 30 MIN: CPT | Mod: GC | Performed by: INTERNAL MEDICINE

## 2022-12-14 RX ORDER — HYDROCODONE BITARTRATE AND ACETAMINOPHEN 5; 325 MG/1; MG/1
1 TABLET ORAL PRN
COMMUNITY

## 2022-12-14 NOTE — LETTER
Date:December 15, 2022      Patient was self referred, no letter generated. Do not send.        [facility] Health Information

## 2022-12-14 NOTE — PATIENT INSTRUCTIONS
Thank you for coming to the [facility] at [place]; please note the following instructions:    1. START: Aspirin 81 mg daily.    2. Follow up with cardiology as needed.         If you have any questions regarding your visit, please contact your care team:     CARDIOLOGY  TELEPHONE NUMBER   Tamela GRALANDMichael, Registered Nurse  Lizette PARKER, Registered Nurse  Alia BERNAL, Registered Medical Assistant  Tiffanie JORDAN, Certified Medical Assistant  Natalya ISABEL, Visit Facilitator [phone] (select option 1)    *After hours: [phone]   For Scheduling Appts:     [phone] (select option 1)    *After hours: [phone]   For the Device Clinic (Pacemakers and ICD's)  Chhaya HUIZAR, Registered Nurse   During business hours: [phone]    *After business hours:  [phone] (select option 4)      Normal test result notifications will be released via [facility] or mailed within 7 business days.  All other test results, will be communicated via telephone once reviewed by your cardiologist.    If you need a medication refill, please contact your pharmacy.  Please allow 3 business days for your refill to be completed.    As always, thank you for trusting us with your health care needs!

## 2022-12-14 NOTE — NURSING NOTE
"Chief Complaint   Patient presents with     Follow Up     3 month follow up       Initial /81   Pulse 66   Ht 1.651 m (5' 5\")   Wt 82.1 kg (181 lb)   SpO2 94%   BMI 30.12 kg/m   Estimated body mass index is 30.12 kg/m  as calculated from the following:    Height as of this encounter: 1.651 m (5' 5\").    Weight as of this encounter: 82.1 kg (181 lb)..  BP completed using cuff size: kalen Hughes CMA (AAMA)  "

## 2022-12-14 NOTE — LETTER
12/14/2022      RE: Saúl Portillo  [address]  [place] [address]       Dear Colleague,    Thank you for the opportunity to participate in the care of your patient, Saúl Portillo, at the [facility]. Please see a copy of my visit note below.                                                                                                [facility]      Referring provider: Self-referred    HPI: Mr. Saúl Portillo is a 71 year old  male with PMH significant for    -Hypertension  -Former smoker (patient quit 25 years ago, 25 pack years)  -Impaired fasting glucose  -COPD  -Alcohol dependence  -COVID-19  -Hyperlipidemia.     Patient is a  and all his prior cardiac care has been at [facility].  Today he is telling me that he wants to undergo an angiogram because angiogram 15 years ago showed a collapsed artery.  Few years ago per patient report he [facility] and he was told he does not need an angiogram.  He tells me that he is very anxious about his coronary artery disease and he wants to know if he has further worsening of coronary artery disease by coronary angiogram.    He tells me that he is not physically very active.  Feels short of breath with activity which has gotten worse over the last 2 years.  Cannot go up and down the stores and struggles with breathing.  Denies chest pain, dizziness, syncope or lower extremity edema.    Reports normal blood pressure at home.  He tells me his blood pressure is usually high when he comes to clinic.  He quit smoking 25 years ago.  He drinks 2 or 3 alcoholic beverages 4-5 times a week.  He was on baby aspirin until few years ago but he quit because of multiple bruises.  He is currently on rosuvastatin 40 mg, metoprolol 25 mg twice daily and inhalers.  Prior cardiac tests are not available for me to see today.  Patient tells  me that he is going to [facility].    Medications, personal, family, and social history reviewed with patient and revised.    Interval history 12/14/2022:     A coronary CTA with FFR revealed severe stenosis of prox-mid and distal RCA as well as LCx stenosis. Patient was referred for coronary angiogram which he underwent on 8/29 which revealed one vessel obstructive CAD with  of mRCA. No interventions were performed.   Since then, he has been feeling well. He denies chest pain/pressure, dizziness, palpitations, orthopnea, weight gain or syncope.  He has exertional SOB after walking less than a block, but this has been stable for 20 years, and has been attributed to his COPD.  Despite his exertional SOB, Mr. Portillo tries to stay active. Just this morning, he tried to shovel snow with no limitation.    PAST MEDICAL HISTORY:  As above    CURRENT MEDICATIONS:  Current Outpatient Medications   Medication Sig Dispense Refill     doxycycline hyclate (VIBRA-TABS) 100 MG tablet Take 100 mg by mouth       gabapentin (NEURONTIN) 100 MG capsule 100 mg       HYDROcodone-acetaminophen (NORCO) 5-325 MG tablet Take 1 tablet by mouth as needed for severe pain (7-10)       ipratropium (ATROVENT) 0.03 % nasal spray        metoprolol tartrate (LOPRESSOR) 50 MG tablet Take 25 mg by mouth 2 times daily       mometasone furoate (ASMANEX HFA) 100 MCG/ACT inhaler        rosuvastatin (CRESTOR) 40 MG tablet Take 20 mg by mouth       tiotropium-olodaterol 2.5-2.5 MCG/ACT AERS        aspirin (ASA) 81 MG EC tablet Take 1 tablet (81 mg) by mouth daily (Patient not taking: Reported on 12/14/2022)       predniSONE (DELTASONE) 20 MG tablet Take 40 mg by mouth (Patient not taking: Reported on 9/21/2022)         PAST SURGICAL HISTORY:  Past Surgical History:   Procedure Laterality Date     CARDIAC SURGERY       CV CORONARY ANGIOGRAM N/A 8/29/2022    Procedure: Coronary Angiogram;  Surgeon: Wili Pineda  "MD Rudi;  Location:  HEART CARDIAC CATH LAB     CV INSTANTANEOUS WAVE-FREE RATIO N/A 8/29/2022    Procedure: Instantaneous Wave-Free Ratio;  Surgeon: Wili Pineda MD;  Location:  HEART CARDIAC CATH LAB     HERNIA REPAIR       ORTHOPEDIC SURGERY         ALLERGIES:     Allergies   Allergen Reactions     Atorvastatin Muscle Pain (Myalgia)       FAMILY HISTORY:  History reviewed. No pertinent family history.      SOCIAL HISTORY:  Social History     Tobacco Use     Smoking status: Never     Smokeless tobacco: Never   Substance Use Topics     Alcohol use: Yes     Comment: 2-3 beers a day     Drug use: Never       ROS:   Constitutional: No fever, chills, or sweats. Weight stable.   Cardiovascular: As per HPI.       Exam:  /81   Pulse 66   Ht 1.651 m (5' 5\")   Wt 82.1 kg (181 lb)   SpO2 94%   BMI 30.12 kg/m    GENERAL APPEARANCE: alert and no distress  RESPIRATORY: lungs clear to auscultation - no rales, rhonchi or wheezes, no use of accessory muscles, no retractions, respirations are unlabored, normal respiratory rate  CARDIOVASCULAR: regular rhythm, normal S1, S2, no S3 or S4 and no murmur, click or rub, precordium quiet with normal PMI.  EXTREMITIES: no edema     I have reviewed the labs and personally reviewed the imaging below and made my comment in the assessment and plan.    Labs:  CBC RESULTS:   Lab Results   Component Value Date    WBC 7.4 08/29/2022    RBC 5.44 08/29/2022    HGB 15.9 08/29/2022    HCT 48.6 08/29/2022    MCV 89 08/29/2022    MCH 29.2 08/29/2022    MCHC 32.7 08/29/2022    RDW 13.5 08/29/2022     08/29/2022       BMP RESULTS:  Lab Results   Component Value Date     08/29/2022    POTASSIUM 4.0 08/29/2022    POTASSIUM 4.5 07/08/2022    CHLORIDE 103 08/29/2022    CHLORIDE 106 07/08/2022    CO2 23 08/29/2022    CO2 28 07/08/2022    ANIONGAP 12 08/29/2022    ANIONGAP 5 07/08/2022     (H) 08/29/2022     (H) 07/08/2022    BUN 19.8 " 08/29/2022    BUN 21 07/08/2022    CR 1.12 08/29/2022    GFRESTIMATED 70 08/29/2022    GFRESTIMATED >60 08/11/2022    VIPUL 9.3 08/29/2022      EKG 8/29/2022      Echocardiogram 7/15/2022     Global and regional left ventricular function is normal with an EF of 55-60%.  Global right ventricular function is normal.  No significant valvular abnormalities present.  Previous study not available for comparison.      Coronary CT 7/8/2022  IMPRESSION:  1.  Severe stenoses in the proximal-mid and distal segments of the  right coronary artery. Both lesions are likely chronic total  occlusions.  2.  Hemodynamically significant lesion in the mid-left circumflex  artery (CT-FFR 0.71).   3.  Moderate lesion in the proximal LAD that is of borderline  hemodynamic significance (CT-FFR 0.79).  4.  Total Agatston score 1743 placing the patient in the 90th  percentile when compared to an age- and gender-matched control group.  5.  Please review the separate Radiology report for incidental  noncardiac findings.    Coronary angiography 8/29/2022  One vessel obstructive CAD ( of mRCA). Otherwise mild to moderate non obstructive CAD elsewhere (OM stenosis not hemodynamically significant as assessed by dPR of 0.97 and proximal LAD stenosis not hemodynamically significant as assessed by dPR of 0.92).        Assessment and Plan:     # Coronary artery disease ( of RCA in 8/2022, as by patient known  for the last 15 years. Prior angiogram not on record)  #Former smoker    Plan:  - Continue aspirin 81 mg daily, rosuvastatin 20 mg daily (patient was not taking aspirin because of easy bruising. Explained risks and benefits of aspirin in terms of secondary prevention. Patient agreed to restart aspirin)  - No angina, no need for anti anginals.  - Goal LDL<70 (already 69 in 7/2022)  - Regular exercise  - Mediterranean diet    #Hypertension  -Normal blood pressure in clinic today  Continue metoprolol 25 mg twice  daily.    #Hyperlipidemia  LDL<70  Continue rosuvastatin 20 mg daily    Return to clinic as needed.    Discussed with Dr. Chuy Gao MD  Cardiovascular disease fellow  [facility]  [phone]  12/14/2022 8:53 AM        Attending note  12/7/2022      I have personally seen and examined the patient in clinic today. I agree with the fellow's findings and plan of care as documented in the note.       I personally reviewed vital signs, medications, labs, imaging, and ECG.     The history and physical findings are accurate as recorded. My additional findings, if any, have been incorporated into the body of the note. The assessment and plans outlined reflect our joint decision making.     Total time spent 30 minutes including prior charting, face-to-face clinic visit, review of medical records and medical documentation.     Hina CHIN MD  [facility] Division of Cardiology  Pager [phone]            Please do not hesitate to contact me if you have any questions/concerns.     Sincerely,     Hina Chin MD

## 2023-04-23 ENCOUNTER — HEALTH MAINTENANCE LETTER (OUTPATIENT)
Age: 73
End: 2023-04-23

## 2023-09-24 ENCOUNTER — HEALTH MAINTENANCE LETTER (OUTPATIENT)
Age: 73
End: 2023-09-24

## 2024-11-17 ENCOUNTER — HEALTH MAINTENANCE LETTER (OUTPATIENT)
Age: 74
End: 2024-11-17

## (undated) DEVICE — GW VASC .035IN DIA 260CML 7CML 3 MM RADIUS J CURVE 502455

## (undated) DEVICE — SLEEVE TR BAND RADIAL COMPRESSION DEVICE 24CM TRB24-REG

## (undated) DEVICE — VALVE HEMOSTASIS .096" COPILOT MECH 1003331

## (undated) DEVICE — CATH GUIDING BLUE YELLOW PTFE XB3.5 6FRX100CM 67005400

## (undated) DEVICE — INTRO GLIDESHEATH SLENDER 6FR 10X45CM 60-1060

## (undated) DEVICE — PACK HEART LEFT CUSTOM

## (undated) DEVICE — FASTENER CATH BALLOON CLAMPX2 STATLOCK 0684-00-493

## (undated) DEVICE — KIT HAND CONTROL ACIST 014644 AR-P54

## (undated) DEVICE — Device

## (undated) DEVICE — GUIDEWIRE OPTOWIRE 3 W/O GAUGE FACTOR CONNECTOR F1032

## (undated) DEVICE — KIT DVC ANGIO IBASIXCOMPAK 13INX20ML 3WAY IN4430

## (undated) DEVICE — MANIFOLD KIT ANGIO AUTOMATED 014613

## (undated) DEVICE — TUBING PRESSURE 30"

## (undated) RX ORDER — METOPROLOL TARTRATE 1 MG/ML
INJECTION, SOLUTION INTRAVENOUS
Status: DISPENSED
Start: 2022-08-11

## (undated) RX ORDER — FENTANYL CITRATE 50 UG/ML
INJECTION, SOLUTION INTRAMUSCULAR; INTRAVENOUS
Status: DISPENSED
Start: 2022-08-29

## (undated) RX ORDER — NICARDIPINE HCL-0.9% SOD CHLOR 1 MG/10 ML
SYRINGE (ML) INTRAVENOUS
Status: DISPENSED
Start: 2022-08-29

## (undated) RX ORDER — SODIUM CHLORIDE 9 MG/ML
INJECTION, SOLUTION INTRAVENOUS
Status: DISPENSED
Start: 2022-08-29

## (undated) RX ORDER — ASPIRIN 81 MG/1
TABLET, CHEWABLE ORAL
Status: DISPENSED
Start: 2022-08-29

## (undated) RX ORDER — NITROGLYCERIN 0.4 MG/1
TABLET SUBLINGUAL
Status: DISPENSED
Start: 2022-08-11

## (undated) RX ORDER — NITROGLYCERIN 5 MG/ML
VIAL (ML) INTRAVENOUS
Status: DISPENSED
Start: 2022-08-29

## (undated) RX ORDER — HEPARIN SODIUM 1000 [USP'U]/ML
INJECTION, SOLUTION INTRAVENOUS; SUBCUTANEOUS
Status: DISPENSED
Start: 2022-08-29